# Patient Record
Sex: FEMALE | Race: BLACK OR AFRICAN AMERICAN | NOT HISPANIC OR LATINO | URBAN - METROPOLITAN AREA
[De-identification: names, ages, dates, MRNs, and addresses within clinical notes are randomized per-mention and may not be internally consistent; named-entity substitution may affect disease eponyms.]

---

## 2019-03-31 ENCOUNTER — EMERGENCY (EMERGENCY)
Facility: HOSPITAL | Age: 29
LOS: 1 days | Discharge: HOME | End: 2019-03-31
Attending: EMERGENCY MEDICINE
Payer: MEDICAID

## 2019-03-31 VITALS
TEMPERATURE: 100 F | DIASTOLIC BLOOD PRESSURE: 74 MMHG | SYSTOLIC BLOOD PRESSURE: 160 MMHG | RESPIRATION RATE: 18 BRPM | HEART RATE: 104 BPM | OXYGEN SATURATION: 100 %

## 2019-03-31 VITALS
DIASTOLIC BLOOD PRESSURE: 84 MMHG | OXYGEN SATURATION: 97 % | SYSTOLIC BLOOD PRESSURE: 122 MMHG | HEIGHT: 64 IN | RESPIRATION RATE: 16 BRPM | WEIGHT: 240.08 LBS | TEMPERATURE: 99 F | HEART RATE: 109 BPM

## 2019-03-31 LAB
ALBUMIN SERPL ELPH-MCNC: 4.1 G/DL — SIGNIFICANT CHANGE UP (ref 3.3–5.2)
ALP SERPL-CCNC: 65 U/L — SIGNIFICANT CHANGE UP (ref 40–120)
ALT FLD-CCNC: 18 U/L — SIGNIFICANT CHANGE UP
ANION GAP SERPL CALC-SCNC: 11 MMOL/L — SIGNIFICANT CHANGE UP (ref 5–17)
AST SERPL-CCNC: 13 U/L — SIGNIFICANT CHANGE UP
BASOPHILS # BLD AUTO: 0 K/UL — SIGNIFICANT CHANGE UP (ref 0–0.2)
BASOPHILS NFR BLD AUTO: 0.3 % — SIGNIFICANT CHANGE UP (ref 0–2)
BILIRUB SERPL-MCNC: <0.2 MG/DL — LOW (ref 0.4–2)
BUN SERPL-MCNC: 8 MG/DL — SIGNIFICANT CHANGE UP (ref 8–20)
CALCIUM SERPL-MCNC: 8.8 MG/DL — SIGNIFICANT CHANGE UP (ref 8.6–10.2)
CHLORIDE SERPL-SCNC: 103 MMOL/L — SIGNIFICANT CHANGE UP (ref 98–107)
CO2 SERPL-SCNC: 25 MMOL/L — SIGNIFICANT CHANGE UP (ref 22–29)
CREAT SERPL-MCNC: 0.74 MG/DL — SIGNIFICANT CHANGE UP (ref 0.5–1.3)
D DIMER BLD IA.RAPID-MCNC: 170 NG/ML DDU — SIGNIFICANT CHANGE UP
EOSINOPHIL # BLD AUTO: 0.1 K/UL — SIGNIFICANT CHANGE UP (ref 0–0.5)
EOSINOPHIL NFR BLD AUTO: 1.1 % — SIGNIFICANT CHANGE UP (ref 0–6)
GLUCOSE SERPL-MCNC: 109 MG/DL — SIGNIFICANT CHANGE UP (ref 70–115)
HCT VFR BLD CALC: 36.1 % — LOW (ref 37–47)
HGB BLD-MCNC: 11.5 G/DL — LOW (ref 12–16)
LYMPHOCYTES # BLD AUTO: 0.8 K/UL — LOW (ref 1–4.8)
LYMPHOCYTES # BLD AUTO: 11 % — LOW (ref 20–55)
MCHC RBC-ENTMCNC: 25.8 PG — LOW (ref 27–31)
MCHC RBC-ENTMCNC: 31.9 G/DL — LOW (ref 32–36)
MCV RBC AUTO: 80.9 FL — LOW (ref 81–99)
MONOCYTES # BLD AUTO: 0.4 K/UL — SIGNIFICANT CHANGE UP (ref 0–0.8)
MONOCYTES NFR BLD AUTO: 5.6 % — SIGNIFICANT CHANGE UP (ref 3–10)
NEUTROPHILS # BLD AUTO: 5.7 K/UL — SIGNIFICANT CHANGE UP (ref 1.8–8)
NEUTROPHILS NFR BLD AUTO: 81.7 % — HIGH (ref 37–73)
PLATELET # BLD AUTO: 350 K/UL — SIGNIFICANT CHANGE UP (ref 150–400)
POTASSIUM SERPL-MCNC: 3.6 MMOL/L — SIGNIFICANT CHANGE UP (ref 3.5–5.3)
POTASSIUM SERPL-SCNC: 3.6 MMOL/L — SIGNIFICANT CHANGE UP (ref 3.5–5.3)
PROT SERPL-MCNC: 7.8 G/DL — SIGNIFICANT CHANGE UP (ref 6.6–8.7)
RBC # BLD: 4.46 M/UL — SIGNIFICANT CHANGE UP (ref 4.4–5.2)
RBC # FLD: 16.2 % — HIGH (ref 11–15.6)
SODIUM SERPL-SCNC: 139 MMOL/L — SIGNIFICANT CHANGE UP (ref 135–145)
TROPONIN T SERPL-MCNC: <0.01 NG/ML — SIGNIFICANT CHANGE UP (ref 0–0.06)
WBC # BLD: 7 K/UL — SIGNIFICANT CHANGE UP (ref 4.8–10.8)
WBC # FLD AUTO: 7 K/UL — SIGNIFICANT CHANGE UP (ref 4.8–10.8)

## 2019-03-31 PROCEDURE — 80053 COMPREHEN METABOLIC PANEL: CPT

## 2019-03-31 PROCEDURE — 93005 ELECTROCARDIOGRAM TRACING: CPT

## 2019-03-31 PROCEDURE — 99284 EMERGENCY DEPT VISIT MOD MDM: CPT | Mod: 25

## 2019-03-31 PROCEDURE — 85027 COMPLETE CBC AUTOMATED: CPT

## 2019-03-31 PROCEDURE — 84484 ASSAY OF TROPONIN QUANT: CPT

## 2019-03-31 PROCEDURE — 36415 COLL VENOUS BLD VENIPUNCTURE: CPT

## 2019-03-31 PROCEDURE — 96375 TX/PRO/DX INJ NEW DRUG ADDON: CPT

## 2019-03-31 PROCEDURE — 93010 ELECTROCARDIOGRAM REPORT: CPT

## 2019-03-31 PROCEDURE — 85379 FIBRIN DEGRADATION QUANT: CPT

## 2019-03-31 PROCEDURE — 71045 X-RAY EXAM CHEST 1 VIEW: CPT

## 2019-03-31 PROCEDURE — 71045 X-RAY EXAM CHEST 1 VIEW: CPT | Mod: 26

## 2019-03-31 PROCEDURE — 94640 AIRWAY INHALATION TREATMENT: CPT

## 2019-03-31 PROCEDURE — 99284 EMERGENCY DEPT VISIT MOD MDM: CPT

## 2019-03-31 PROCEDURE — 96374 THER/PROPH/DIAG INJ IV PUSH: CPT

## 2019-03-31 RX ORDER — LEVETIRACETAM 250 MG/1
1 TABLET, FILM COATED ORAL
Qty: 0 | Refills: 0 | COMMUNITY

## 2019-03-31 RX ORDER — KETOROLAC TROMETHAMINE 30 MG/ML
15 SYRINGE (ML) INJECTION ONCE
Qty: 0 | Refills: 0 | Status: DISCONTINUED | OUTPATIENT
Start: 2019-03-31 | End: 2019-03-31

## 2019-03-31 RX ORDER — IPRATROPIUM/ALBUTEROL SULFATE 18-103MCG
3 AEROSOL WITH ADAPTER (GRAM) INHALATION ONCE
Qty: 0 | Refills: 0 | Status: COMPLETED | OUTPATIENT
Start: 2019-03-31 | End: 2019-03-31

## 2019-03-31 RX ORDER — AMLODIPINE BESYLATE 2.5 MG/1
0 TABLET ORAL
Qty: 0 | Refills: 0 | COMMUNITY

## 2019-03-31 RX ORDER — ACETAMINOPHEN 500 MG
650 TABLET ORAL ONCE
Qty: 0 | Refills: 0 | Status: COMPLETED | OUTPATIENT
Start: 2019-03-31 | End: 2019-03-31

## 2019-03-31 RX ORDER — OMEPRAZOLE 10 MG/1
0 CAPSULE, DELAYED RELEASE ORAL
Qty: 0 | Refills: 0 | COMMUNITY

## 2019-03-31 RX ADMIN — Medication 15 MILLIGRAM(S): at 17:53

## 2019-03-31 RX ADMIN — Medication 650 MILLIGRAM(S): at 17:54

## 2019-03-31 RX ADMIN — Medication 125 MILLIGRAM(S): at 17:06

## 2019-03-31 RX ADMIN — Medication 3 MILLILITER(S): at 17:07

## 2019-03-31 NOTE — ED ADULT NURSE NOTE - OBJECTIVE STATEMENT
28 year old female in no acute distress, alert and oriented x 4, BIBA from home c/o asthma exacerbation, cough and cp with cough x 2 days, reports 5 year old daughter recently ill with same symptoms. Cardiac and O2 monitoring in place, fall precautions in place, Dr. Mccabe at bedside, will monitor.

## 2019-03-31 NOTE — ED PROVIDER NOTE - PROGRESS NOTE DETAILS
Yes, it is OK to put the patient in cancellation spot for a 20 minute visit.   Pt's breathing has improved. Pt. only has mild headache and leg and back pain. All are chronic issues. Pt. requesting pain management follow up. Pt. stable for discharge.

## 2019-03-31 NOTE — CHART NOTE - NSCHARTNOTEFT_GEN_A_CORE
RYANNote: p/c from ED  ([place]) pt medically ready for d/c, unable to arrange [facility] transport for pt to return home. [facility] requested reservation number [phone] . A router will  with taxi company and [facility] . Pt awaiting in the ED lobby.

## 2019-03-31 NOTE — ED PROVIDER NOTE - OBJECTIVE STATEMENT
Pt. present to ED c/o chest pain x2 days. CP worst with cough or deep breath. Pt. has hx of asthma/bronchitis(non-smoker) and states that she has been using her inhaler for the past few days with no relief. Pt. states that he has been wheezing. Pt's daughter was sick with fever and vomiting prior to patient getting sick. Pt's cough is productive. Pt. had fever yesterday. Pt. also has hx of HTN/Seizure/DVT(2012). Pt. also c/o b/l leg pain.

## 2019-03-31 NOTE — ED ADULT TRIAGE NOTE - CHIEF COMPLAINT QUOTE
Pt brought in by ambulance from home for eval of intermittent chest pain, non radiating x2 days. Pt states that she has asthma and states that she has chest tightness unrelieved by MDI. Resps even and unlabored at present.

## 2019-03-31 NOTE — ED ADULT NURSE NOTE - PMH
Acid reflux    Anxiety    Bulging lumbar disc    Depressed    DVT (deep venous thrombosis)    HTN (hypertension)    Nerve damage  bilateral legs  PTSD (post-traumatic stress disorder)    Seizures

## 2019-07-13 PROBLEM — F43.10 POST-TRAUMATIC STRESS DISORDER, UNSPECIFIED: Chronic | Status: ACTIVE | Noted: 2019-03-31

## 2019-07-13 PROBLEM — K21.9 GASTRO-ESOPHAGEAL REFLUX DISEASE WITHOUT ESOPHAGITIS: Chronic | Status: ACTIVE | Noted: 2019-03-31

## 2019-07-13 PROBLEM — T14.8XXA OTHER INJURY OF UNSPECIFIED BODY REGION, INITIAL ENCOUNTER: Chronic | Status: ACTIVE | Noted: 2019-03-31

## 2019-07-13 PROBLEM — R56.9 UNSPECIFIED CONVULSIONS: Chronic | Status: ACTIVE | Noted: 2019-03-31

## 2019-07-13 PROBLEM — M51.26 OTHER INTERVERTEBRAL DISC DISPLACEMENT, LUMBAR REGION: Chronic | Status: ACTIVE | Noted: 2019-03-31

## 2019-07-15 ENCOUNTER — APPOINTMENT (OUTPATIENT)
Dept: NEUROLOGY | Facility: CLINIC | Age: 29
End: 2019-07-15
Payer: MEDICARE

## 2019-07-15 VITALS — SYSTOLIC BLOOD PRESSURE: 130 MMHG | HEIGHT: 64 IN | DIASTOLIC BLOOD PRESSURE: 80 MMHG

## 2019-07-15 DIAGNOSIS — Z86.59 PERSONAL HISTORY OF OTHER MENTAL AND BEHAVIORAL DISORDERS: ICD-10-CM

## 2019-07-15 DIAGNOSIS — M54.16 RADICULOPATHY, LUMBAR REGION: ICD-10-CM

## 2019-07-15 DIAGNOSIS — Z82.49 FAMILY HISTORY OF ISCHEMIC HEART DISEASE AND OTHER DISEASES OF THE CIRCULATORY SYSTEM: ICD-10-CM

## 2019-07-15 DIAGNOSIS — Z86.79 PERSONAL HISTORY OF OTHER DISEASES OF THE CIRCULATORY SYSTEM: ICD-10-CM

## 2019-07-15 DIAGNOSIS — G43.909 MIGRAINE, UNSPECIFIED, NOT INTRACTABLE, W/OUT STATUS MIGRAINOSUS: ICD-10-CM

## 2019-07-15 DIAGNOSIS — Z87.19 PERSONAL HISTORY OF OTHER DISEASES OF THE DIGESTIVE SYSTEM: ICD-10-CM

## 2019-07-15 DIAGNOSIS — Z78.9 OTHER SPECIFIED HEALTH STATUS: ICD-10-CM

## 2019-07-15 DIAGNOSIS — G40.909 EPILEPSY, UNSPECIFIED, NOT INTRACTABLE, W/OUT STATUS EPILEPTICUS: ICD-10-CM

## 2019-07-15 PROCEDURE — 99204 OFFICE O/P NEW MOD 45 MIN: CPT

## 2019-07-15 RX ORDER — GABAPENTIN 100 MG/1
CAPSULE ORAL
Refills: 0 | Status: ACTIVE | COMMUNITY

## 2019-07-15 RX ORDER — CLONIDINE HYDROCHLORIDE 0.3 MG/1
TABLET ORAL
Refills: 0 | Status: ACTIVE | COMMUNITY

## 2019-07-15 RX ORDER — AMLODIPINE BESYLATE 5 MG/1
TABLET ORAL
Refills: 0 | Status: ACTIVE | COMMUNITY

## 2019-07-15 RX ORDER — OMEPRAZOLE MAGNESIUM 40 MG/1
40 CAPSULE, DELAYED RELEASE ORAL
Refills: 0 | Status: ACTIVE | COMMUNITY

## 2019-07-15 NOTE — REVIEW OF SYSTEMS
[Numbness] : numbness [As Noted in HPI] : as noted in HPI [Limb Pain] : limb pain [Negative] : Heme/Lymph [de-identified] : History of possible seizure, Pain in her legs
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
## 2019-07-15 NOTE — PHYSICAL EXAM
[General Appearance - Alert] : alert [General Appearance - In No Acute Distress] : in no acute distress [General Appearance - Well Nourished] : well nourished [General Appearance - Well Developed] : well developed [FreeTextEntry1] : Obese [Person] : oriented to person [Place] : oriented to place [Time] : oriented to time [Short Term Intact] : short term memory impaired [Remote Intact] : remote memory intact [Registration Intact] : recent registration memory intact [Span Intact] : the attention span was normal [Concentration Intact] : normal concentrating ability [Visual Intact] : visual attention was ~T not ~L decreased [Naming Objects] : no difficulty naming common objects [Repeating Phrases] : no difficulty repeating a phrase [Fluency] : fluency intact [Comprehension] : comprehension intact [Current Events] : adequate knowledge of current events [Past History] : adequate knowledge of personal past history [Cranial Nerves Optic (II)] : visual acuity intact bilaterally,  visual fields full to confrontation, pupils equal round and reactive to light [Cranial Nerves Oculomotor (III)] : extraocular motion intact [Cranial Nerves Trigeminal (V)] : facial sensation intact symmetrically [Cranial Nerves Facial (VII)] : face symmetrical [Cranial Nerves Vestibulocochlear (VIII)] : hearing was intact bilaterally [Cranial Nerves Glossopharyngeal (IX)] : tongue and palate midline [Cranial Nerves Accessory (XI - Cranial And Spinal)] : head turning and shoulder shrug symmetric [Cranial Nerves Hypoglossal (XII)] : there was no tongue deviation with protrusion [Motor Strength] : muscle strength was normal in all four extremities [No Muscle Atrophy] : normal bulk in all four extremities [Paresis Pronator Drift Right-Sided] : no pronator drift on the right [Paresis Pronator Drift Left-Sided] : no pronator drift on the left [Sensation Tactile Decrease] : light touch was intact [Sensation Pain / Temperature Decrease] : pain and temperature was intact [Sensation Vibration Decrease] : vibration was intact [Proprioception] : proprioception was intact [Balance] : balance was intact [Tremor] : no tremor present [Coordination - Dysmetria Impaired Finger-to-Nose Bilateral] : not present [1+] : Patella left 1+ [FreeTextEntry4] : one out of  3 recall [FreeTextEntry6] : Mild give way weakness in the legs [Sclera] : the sclera and conjunctiva were normal [PERRL With Normal Accommodation] : pupils were equal in size, round, reactive to light, with normal accommodation [Extraocular Movements] : extraocular movements were intact [Optic Disc Abnormality] : the optic disc were normal in size and color [No APD] : no afferent pupillary defect [No ISABEL] : no internuclear ophthalmoplegia [Full Visual Field] : full visual field [___ +] : [unfilled]+ pitting edema to L ankle [Abnormal Walk] : normal gait [Involuntary Movements] : no involuntary movements were seen [Motor Tone] : muscle strength and tone were normal

## 2019-07-15 NOTE — CONSULT LETTER
[Dear  ___] : Dear ~FRANDY, [Consult Letter:] : I had the pleasure of evaluating your patient, [unfilled]. [Please see my note below.] : Please see my note below. [Consult Closing:] : Thank you very much for allowing me to participate in the care of this patient.  If you have any questions, please do not hesitate to contact me. [Sincerely,] : Sincerely, [FreeTextEntry3] : Umberto Lemus M.D., Ph.D. DPN-N\par [facility]\par Neurology at [place]\par Medical Director of Stroke Services\par [facility]\par

## 2019-07-15 NOTE — ASSESSMENT
[FreeTextEntry1] : This is a 29-year-old woman with a history of possible nerve damage into her legs as well as seizures. I would like to root do an EMG nerve conduction study of her legs to confirm any nerve damage. This will help determine if and how I can treat her. Regarding the pain she should probably see pain management. Regarding her seizure disorder and like to do an EEG to confirm any epileptic activity. If so I may need to increase her Keppra. I will call her with results of these tests and see her back in the office in 3 months, sooner should the need arise.

## 2019-07-26 ENCOUNTER — APPOINTMENT (OUTPATIENT)
Dept: NEUROLOGY | Facility: CLINIC | Age: 29
End: 2019-07-26

## 2019-09-02 PROBLEM — Z86.59 HISTORY OF POST TRAUMATIC STRESS DISORDER: Status: RESOLVED | Noted: 2019-07-15 | Resolved: 2019-09-02

## 2019-09-19 ENCOUNTER — APPOINTMENT (OUTPATIENT)
Dept: NEUROLOGY | Facility: CLINIC | Age: 29
End: 2019-09-19

## 2019-10-14 ENCOUNTER — APPOINTMENT (OUTPATIENT)
Dept: NEUROLOGY | Facility: CLINIC | Age: 29
End: 2019-10-14

## 2020-06-15 ENCOUNTER — APPOINTMENT (OUTPATIENT)
Dept: INTERNAL MEDICINE | Facility: CLINIC | Age: 30
End: 2020-06-15

## 2023-04-06 ENCOUNTER — EMERGENCY (EMERGENCY)
Facility: HOSPITAL | Age: 33
LOS: 1 days | Discharge: HOME | End: 2023-04-06
Attending: EMERGENCY MEDICINE
Payer: MEDICARE

## 2023-04-06 VITALS
SYSTOLIC BLOOD PRESSURE: 159 MMHG | HEART RATE: 85 BPM | OXYGEN SATURATION: 100 % | TEMPERATURE: 98 F | DIASTOLIC BLOOD PRESSURE: 133 MMHG | RESPIRATION RATE: 18 BRPM

## 2023-04-06 VITALS
OXYGEN SATURATION: 99 % | HEART RATE: 106 BPM | TEMPERATURE: 98 F | DIASTOLIC BLOOD PRESSURE: 97 MMHG | RESPIRATION RATE: 18 BRPM | SYSTOLIC BLOOD PRESSURE: 151 MMHG

## 2023-04-06 LAB
ANION GAP SERPL CALC-SCNC: 10 MMOL/L — SIGNIFICANT CHANGE UP (ref 5–17)
APTT BLD: 33.2 SEC — SIGNIFICANT CHANGE UP (ref 27.5–35.5)
BASOPHILS # BLD AUTO: 0.03 K/UL — SIGNIFICANT CHANGE UP (ref 0–0.2)
BASOPHILS NFR BLD AUTO: 0.7 % — SIGNIFICANT CHANGE UP (ref 0–2)
BUN SERPL-MCNC: 8.2 MG/DL — SIGNIFICANT CHANGE UP (ref 8–20)
CALCIUM SERPL-MCNC: 8.4 MG/DL — SIGNIFICANT CHANGE UP (ref 8.4–10.5)
CHLORIDE SERPL-SCNC: 104 MMOL/L — SIGNIFICANT CHANGE UP (ref 96–108)
CO2 SERPL-SCNC: 22 MMOL/L — SIGNIFICANT CHANGE UP (ref 22–29)
CREAT SERPL-MCNC: 0.76 MG/DL — SIGNIFICANT CHANGE UP (ref 0.5–1.3)
EGFR: 105 ML/MIN/1.73M2 — SIGNIFICANT CHANGE UP
EOSINOPHIL # BLD AUTO: 0.1 K/UL — SIGNIFICANT CHANGE UP (ref 0–0.5)
EOSINOPHIL NFR BLD AUTO: 2.2 % — SIGNIFICANT CHANGE UP (ref 0–6)
GLUCOSE SERPL-MCNC: 83 MG/DL — SIGNIFICANT CHANGE UP (ref 70–99)
HCG SERPL-ACNC: <4 MIU/ML — SIGNIFICANT CHANGE UP
HCT VFR BLD CALC: 30.6 % — LOW (ref 34.5–45)
HGB BLD-MCNC: 10.1 G/DL — LOW (ref 11.5–15.5)
IMM GRANULOCYTES NFR BLD AUTO: 0 % — SIGNIFICANT CHANGE UP (ref 0–0.9)
INR BLD: 1.14 RATIO — SIGNIFICANT CHANGE UP (ref 0.88–1.16)
LYMPHOCYTES # BLD AUTO: 2.35 K/UL — SIGNIFICANT CHANGE UP (ref 1–3.3)
LYMPHOCYTES # BLD AUTO: 52.5 % — HIGH (ref 13–44)
MCHC RBC-ENTMCNC: 26.8 PG — LOW (ref 27–34)
MCHC RBC-ENTMCNC: 33 GM/DL — SIGNIFICANT CHANGE UP (ref 32–36)
MCV RBC AUTO: 81.2 FL — SIGNIFICANT CHANGE UP (ref 80–100)
MONOCYTES # BLD AUTO: 0.36 K/UL — SIGNIFICANT CHANGE UP (ref 0–0.9)
MONOCYTES NFR BLD AUTO: 8 % — SIGNIFICANT CHANGE UP (ref 2–14)
NEUTROPHILS # BLD AUTO: 1.64 K/UL — LOW (ref 1.8–7.4)
NEUTROPHILS NFR BLD AUTO: 36.6 % — LOW (ref 43–77)
PLATELET # BLD AUTO: 265 K/UL — SIGNIFICANT CHANGE UP (ref 150–400)
POTASSIUM SERPL-MCNC: 3.7 MMOL/L — SIGNIFICANT CHANGE UP (ref 3.5–5.3)
POTASSIUM SERPL-SCNC: 3.7 MMOL/L — SIGNIFICANT CHANGE UP (ref 3.5–5.3)
PROTHROM AB SERPL-ACNC: 13.3 SEC — SIGNIFICANT CHANGE UP (ref 10.5–13.4)
RBC # BLD: 3.77 M/UL — LOW (ref 3.8–5.2)
RBC # FLD: 17.6 % — HIGH (ref 10.3–14.5)
SODIUM SERPL-SCNC: 136 MMOL/L — SIGNIFICANT CHANGE UP (ref 135–145)
TROPONIN T SERPL-MCNC: <0.01 NG/ML — SIGNIFICANT CHANGE UP (ref 0–0.06)
WBC # BLD: 4.48 K/UL — SIGNIFICANT CHANGE UP (ref 3.8–10.5)
WBC # FLD AUTO: 4.48 K/UL — SIGNIFICANT CHANGE UP (ref 3.8–10.5)

## 2023-04-06 PROCEDURE — 80048 BASIC METABOLIC PNL TOTAL CA: CPT

## 2023-04-06 PROCEDURE — 85610 PROTHROMBIN TIME: CPT

## 2023-04-06 PROCEDURE — 71275 CT ANGIOGRAPHY CHEST: CPT | Mod: ME

## 2023-04-06 PROCEDURE — 84484 ASSAY OF TROPONIN QUANT: CPT

## 2023-04-06 PROCEDURE — 85025 COMPLETE CBC W/AUTO DIFF WBC: CPT

## 2023-04-06 PROCEDURE — 96374 THER/PROPH/DIAG INJ IV PUSH: CPT | Mod: XU

## 2023-04-06 PROCEDURE — 36415 COLL VENOUS BLD VENIPUNCTURE: CPT

## 2023-04-06 PROCEDURE — 93970 EXTREMITY STUDY: CPT | Mod: 26

## 2023-04-06 PROCEDURE — 99285 EMERGENCY DEPT VISIT HI MDM: CPT

## 2023-04-06 PROCEDURE — 93005 ELECTROCARDIOGRAM TRACING: CPT

## 2023-04-06 PROCEDURE — 85730 THROMBOPLASTIN TIME PARTIAL: CPT

## 2023-04-06 PROCEDURE — 83880 ASSAY OF NATRIURETIC PEPTIDE: CPT

## 2023-04-06 PROCEDURE — G1004: CPT

## 2023-04-06 PROCEDURE — 71275 CT ANGIOGRAPHY CHEST: CPT | Mod: 26,ME

## 2023-04-06 PROCEDURE — 84702 CHORIONIC GONADOTROPIN TEST: CPT

## 2023-04-06 PROCEDURE — 99285 EMERGENCY DEPT VISIT HI MDM: CPT | Mod: 25

## 2023-04-06 PROCEDURE — 93970 EXTREMITY STUDY: CPT

## 2023-04-06 PROCEDURE — 93010 ELECTROCARDIOGRAM REPORT: CPT

## 2023-04-06 RX ORDER — KETOROLAC TROMETHAMINE 30 MG/ML
30 SYRINGE (ML) INJECTION ONCE
Refills: 0 | Status: DISCONTINUED | OUTPATIENT
Start: 2023-04-06 | End: 2023-04-06

## 2023-04-06 RX ORDER — ACETAMINOPHEN 500 MG
975 TABLET ORAL ONCE
Refills: 0 | Status: COMPLETED | OUTPATIENT
Start: 2023-04-06 | End: 2023-04-06

## 2023-04-06 RX ADMIN — Medication 975 MILLIGRAM(S): at 10:50

## 2023-04-06 RX ADMIN — Medication 30 MILLIGRAM(S): at 04:45

## 2023-04-06 NOTE — ED ADULT TRIAGE NOTE - CHIEF COMPLAINT QUOTE
Pt ambulatory with strong and steady gait c/o elevated bp with pounding headache, bilateral leg swelling and pain - from knees down to lower leg, sob on exertion and also reported dog bite to R hand 3 days ago. PMH: DVT bilateral leg, Irregular heart rhythm, Seizure on keppra.

## 2023-04-06 NOTE — ED PROVIDER NOTE - NSFOLLOWUPCLINICS_GEN_ALL_ED_FT
[facility]  Cardiology  [address], [address]  [place] [address]  Phone: [phone]  Fax:

## 2023-04-06 NOTE — ED PROVIDER NOTE - PHYSICAL EXAMINATION
Gen: No acute distress, non toxic  HEENT: Mucous membranes moist, pink conjunctivae, EOMI. PERRL. Airway patent.   CV: RRR, nl s1/s2.  Resp: CTAB, normal rate and effort. No wheezes, rhonchi, or crackles.   GI: Abdomen soft, NT, ND. No rebound, no guarding  Neuro: A&O x4, MAEx4. 5/5 str ext x 4. Sensation intact, symmetric throughout. No FND's. Gait intact. CN 2-12 intact.   MSK: No midline spinal ttp. No visualized or palpable deformities.  Skin: No rashes, skin intact and well perfused. Cap refill <2sec  Vascular: 2+pitting edema left lower extremity. Radial and dorsalis pedal pulses 2+ b/l

## 2023-04-06 NOTE — ED PROVIDER NOTE - OBJECTIVE STATEMENT
32y female PMHx enlarged heart, irregular heart beat, DVT in bilateral lower extremities with "venous duplex surgery" presents for b/l lower leg pain x 3 days, HA. Pt reports 3 days of lower leg pain. Pt reports pain is constant, left leg feels tighter than right. Pt reports HA that began today, similar in characteristic to previous HA. Pt states she told triage she has chest pain for past day that felt like tightness that has resolved. Pt denies SOB, chest pain, palpitations, pain with inspiration, lightheadedness, dizziness, change in vision, confusion, weakness, loss of balance.

## 2023-04-06 NOTE — ED PROVIDER NOTE - NSICDXPASTMEDICALHX_GEN_ALL_CORE_FT
PAST MEDICAL HISTORY:  Acid reflux     Anxiety     Bulging lumbar disc     Depressed     DVT (deep venous thrombosis)     HTN (hypertension)     Nerve damage bilateral legs    PTSD (post-traumatic stress disorder)     Seizures

## 2023-04-06 NOTE — ED PROVIDER NOTE - NS ED ATTENDING STATEMENT MOD
This was a shared visit with the CECILE. I reviewed and verified the documentation and independently performed the documented:

## 2023-04-06 NOTE — ED PROVIDER NOTE - ATTENDING APP SHARED VISIT CONTRIBUTION OF CARE
32y F w/ hx cardiomyopathy, DVT (not on AC), neuropathy, seizures; presents for leg pain. Pt complaining of 3 days of b/l leg pain/swelling. Also complaining of chest tightness for the past day, now resolved. On exam, pt well appearing and in no distress. Stable VS. Heart RRR, lungs CTAB. +Mild nonpitting edema b/l lower extremities. Check CTA chest, US venous duplex lower extremities, labs, EKG.
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
## 2023-04-06 NOTE — ED PROVIDER NOTE - CLINICAL SUMMARY MEDICAL DECISION MAKING FREE TEXT BOX
32y female PMHx enlarged heart, irregular heart beat, DVT in bilateral lower extremities with "venous duplex surgery" presents for b/l lower leg pain x 3 days, HA. Pt reports 3 days of lower leg pain. Pt reports pain is constant, left leg feels tighter than right. Pt reports HA that began today, similar in characteristic to previous HA. Pt states she told triage she has chest pain for past day that felt like tightness that has resolved. Pt denies SOB, chest pain, palpitations, pain with inspiration, lightheadedness, dizziness, change in vision, confusion, weakness, loss of balance.  +pitting edema LLE.   US, Labs, re-assess 32y female PMHx enlarged heart, irregular heart beat, DVT in bilateral lower extremities with "venous duplex surgery" presents for b/l lower leg pain x 3 days, HA. Pt reports 3 days of lower leg pain. Pt reports pain is constant, left leg feels tighter than right. Pt reports HA that began today, similar in characteristic to previous HA. Pt states she told triage she has chest pain for past day that felt like tightness that has resolved. Pt denies SOB, chest pain, palpitations, pain with inspiration, lightheadedness, dizziness, change in vision, confusion, weakness, loss of balance.  PE- +pitting edema LLE.   US, Labs, re-assess  US negative for dvt/ CTA performed and negative for PE in lobar branches  labs reviewed and pt with mild anemia- advised f/u.   SW consulted for resources. Luis cortez.

## 2023-04-06 NOTE — ED ADULT NURSE NOTE - OBJECTIVE STATEMENT
pt. c/o bilateral rib pain, when asked location, states "sides", denies injury, unsure of etiology, onset today.  States it hurts a lot, unable to assign a number to pain.  Denies dizziness/N/V/urinary sxs.  Denies medical hx.  Pt. not a good historian.

## 2023-04-06 NOTE — ED PROVIDER NOTE - PATIENT PORTAL LINK FT
You can access the FollowMyHealth Patient Portal offered by [facility] by registering at the following website: [email or web address]. By joining [employer]’s FollowMyHealth portal, you will also be able to view your health information using other applications (apps) compatible with our system.

## 2023-04-06 NOTE — CHART NOTE - NSCHARTNOTEFT_GEN_A_CORE
RYAN Note: SW made aware by ED provider pt is stable for d/c, is requesting SW for food insecurity. SW met w/ pt at bedside, pt disclosed she currently resides in a shelter with her kids since June ([facility] in [place]), pt stated she doesn't drive and there are no stores in walking distance. Pt stated there is a food pantry at [facility]placed [facility] however residents are only able to go 2x a month. SW discussed w/ pt food pantries in area, list provided, pt also expressed interest [facility] [employer] referral, referral sent on behalf of pt via TEAMS, SW also completed community needs resource assessment and provided pt w/ [facility] Community resource list.     Pt initially requested medicaid taxi home, SW began to arrange however pt then told PA she found a ride and no longer needs transport, medicaid taxi canceled, no other needs identified

## 2023-04-06 NOTE — ED ADULT NURSE REASSESSMENT NOTE - NS ED NURSE REASSESS COMMENT FT1
d/c per PA and CM  iv d/c
assumed care of pt from night RN; pt pending CM/SW for housing placement. offers no complaints , will continue to monitor

## 2023-05-02 ENCOUNTER — TRANSCRIPTION ENCOUNTER (OUTPATIENT)
Age: 33
End: 2023-05-02

## 2023-05-05 ENCOUNTER — APPOINTMENT (OUTPATIENT)
Dept: CARDIOLOGY | Facility: CLINIC | Age: 33
End: 2023-05-05

## 2023-05-30 ENCOUNTER — NON-APPOINTMENT (OUTPATIENT)
Age: 33
End: 2023-05-30

## 2023-05-31 ENCOUNTER — NON-APPOINTMENT (OUTPATIENT)
Age: 33
End: 2023-05-31

## 2023-05-31 ENCOUNTER — APPOINTMENT (OUTPATIENT)
Dept: CARDIOLOGY | Facility: CLINIC | Age: 33
End: 2023-05-31

## 2023-05-31 DIAGNOSIS — R56.9 UNSPECIFIED CONVULSIONS: ICD-10-CM

## 2023-05-31 DIAGNOSIS — I82.4Z9 ACUTE EMBOLISM AND THROMBOSIS OF UNSPECIFIED DEEP VEINS OF UNSPECIFIED DISTAL LOWER EXTREMITY: ICD-10-CM

## 2023-05-31 DIAGNOSIS — M51.36 OTHER INTERVERTEBRAL DISC DEGENERATION, LUMBAR REGION: ICD-10-CM

## 2023-05-31 RX ORDER — LEVETIRACETAM 500 MG/1
500 TABLET, FILM COATED ORAL TWICE DAILY
Qty: 60 | Refills: 5 | Status: ACTIVE | COMMUNITY

## 2023-05-31 RX ORDER — ESCITALOPRAM OXALATE 5 MG/1
TABLET, FILM COATED ORAL
Refills: 0 | Status: ACTIVE | COMMUNITY

## 2023-05-31 RX ORDER — PREDNISONE 20 MG/1
20 TABLET ORAL DAILY
Refills: 0 | Status: DISCONTINUED | COMMUNITY
End: 2023-05-31

## 2023-06-18 ENCOUNTER — EMERGENCY (EMERGENCY)
Facility: HOSPITAL | Age: 33
LOS: 1 days | Discharge: HOME | End: 2023-06-18
Attending: STUDENT IN AN ORGANIZED HEALTH CARE EDUCATION/TRAINING PROGRAM
Payer: MEDICARE

## 2023-06-18 VITALS
HEIGHT: 64 IN | SYSTOLIC BLOOD PRESSURE: 152 MMHG | OXYGEN SATURATION: 100 % | WEIGHT: 237.44 LBS | TEMPERATURE: 99 F | RESPIRATION RATE: 18 BRPM | HEART RATE: 121 BPM | DIASTOLIC BLOOD PRESSURE: 112 MMHG

## 2023-06-18 PROCEDURE — 99285 EMERGENCY DEPT VISIT HI MDM: CPT

## 2023-06-18 RX ORDER — ACETAMINOPHEN 500 MG
975 TABLET ORAL ONCE
Refills: 0 | Status: COMPLETED | OUTPATIENT
Start: 2023-06-18 | End: 2023-06-18

## 2023-06-18 RX ADMIN — Medication 975 MILLIGRAM(S): at 23:53

## 2023-06-18 RX ADMIN — Medication 0.1 MILLIGRAM(S): at 23:53

## 2023-06-18 NOTE — ED ADULT TRIAGE NOTE - HEIGHT IN INCHES
[Essential Hypertension] : essential hypertension [Hyperlipidemia] : hyperlipidemia [Stable] : stable [Not Responding to Treatment] : not responding to treatment [Medication Changes Per Orders] : as documented in orders [de-identified] : 86 [FreeTextEntry1] : tc 163\par 10/12/19 4
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
## 2023-06-18 NOTE — ED ADULT TRIAGE NOTE - CHIEF COMPLAINT QUOTE
c/o of high blood pressure and headache x 2 months. Denies chest pain and headache right now.  Hx of HTN on BP meds (Norvasc, lisinopril, clonidine). Pt reports had lyme disease x 1 year c/o of high blood pressure and headache x 2 months. Denies chest pain and headache right now.  c/o of leg and back pain. Pt from shelter they said she was hallucinating.  Hx of HTN on BP meds (Norvasc, lisinopril, clonidine). Pt reports had lyme disease x 1 year

## 2023-06-18 NOTE — ED PROVIDER NOTE - NSFOLLOWUPINSTRUCTIONS_ED_ALL_ED_FT
-Your blood pressure here is elevated - please take your blood pressure medication as prescribed and try not to miss any doses  -Please follow up with your doctor and ask about your blood pressure medication regimen as your blood pressure here was elevated   -Return with any new/worse/concerning symptoms     Hypertension    Hypertension, commonly called high blood pressure, is when the force of blood pumping through your arteries is too strong. Hypertension forces your heart to work harder to pump blood. Your arteries may become narrow or stiff. Having untreated or uncontrolled hypertension for a long period of time can cause heart attack, stroke, kidney disease, and other problems. If started on a medication, take exactly as prescribed by your health care professional. Maintain a healthy lifestyle and follow up with your primary care physician.    SEEK IMMEDIATE MEDICAL CARE IF YOU HAVE ANY OF THE FOLLOWING SYMPTOMS: severe headache, confusion, chest pain, abdominal pain, vomiting, or shortness of breath.

## 2023-06-18 NOTE — ED ADULT NURSE NOTE - NSFALLUNIVINTERV_ED_ALL_ED
Bed/Stretcher in lowest position, wheels locked, appropriate side rails in place/Call bell, personal items and telephone in reach/Instruct patient to call for assistance before getting out of bed/chair/stretcher/Non-slip footwear applied when patient is off stretcher/[place] to call system/Physically safe environment - no spills, clutter or unnecessary equipment/Purposeful proactive rounding/Room/bathroom lighting operational, light cord in reach

## 2023-06-18 NOTE — ED PROVIDER NOTE - CLINICAL SUMMARY MEDICAL DECISION MAKING FREE TEXT BOX
33 year old female with hx of HTN, enlarged heart, b/l LE nerve damage who presents with hypertension. Will obtain basic labs, EKG, and reassess - [facility] PGY3
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
## 2023-06-18 NOTE — ED PROVIDER NOTE - PATIENT PORTAL LINK FT
You can access the FollowMyHealth Patient Portal offered by [facility] by registering at the following website: [email or web address]. By joining [employer]’s FollowMyHealth portal, you will also be able to view your health information using other applications (apps) compatible with our system.

## 2023-06-18 NOTE — ED ADULT NURSE NOTE - OBJECTIVE STATEMENT
pt. c/o HTN, left sided CP, LH today... pain 8/10.  Has been taking 3 BP meds and clonidine for mental health.  Lives is shelter.  Denies N/V/SOB/fever

## 2023-06-18 NOTE — ED STATDOCS - PROGRESS NOTE DETAILS
32 y/o female w/ hx of HTN, migraines, PTSD, depression anxiety, DVT, cardiac arrythmia presenting with HTN. Reports has been compliant with meds and visited ED because systolic reading was 165 earlier today. Additionally c/o migraine. Will send to main for further eval.

## 2023-06-18 NOTE — ED ADULT NURSE NOTE - CHIEF COMPLAINT QUOTE
c/o of high blood pressure and headache x 2 months. Denies chest pain and headache right now.  c/o of leg and back pain. Pt from shelter they said she was hallucinating.  Hx of HTN on BP meds (Norvasc, lisinopril, clonidine). Pt reports had lyme disease x 1 year

## 2023-06-18 NOTE — ED PROVIDER NOTE - PHYSICAL EXAMINATION
Gen: Well appearing in NAD  Head: NC/AT  Neck: trachea midline  CV: regular rate   Resp:  No distress  Ext: no deformities  Neuro:  A&O appears non focal  Skin:  Warm and dry as visualized  Psych: Calm, cooperative

## 2023-06-19 LAB
ALBUMIN SERPL ELPH-MCNC: 4 G/DL — SIGNIFICANT CHANGE UP (ref 3.3–5.2)
ALP SERPL-CCNC: 58 U/L — SIGNIFICANT CHANGE UP (ref 40–120)
ALT FLD-CCNC: 19 U/L — SIGNIFICANT CHANGE UP
ANION GAP SERPL CALC-SCNC: 10 MMOL/L — SIGNIFICANT CHANGE UP (ref 5–17)
AST SERPL-CCNC: 17 U/L — SIGNIFICANT CHANGE UP
BASOPHILS # BLD AUTO: 0.04 K/UL — SIGNIFICANT CHANGE UP (ref 0–0.2)
BASOPHILS NFR BLD AUTO: 0.7 % — SIGNIFICANT CHANGE UP (ref 0–2)
BILIRUB SERPL-MCNC: 0.2 MG/DL — LOW (ref 0.4–2)
BUN SERPL-MCNC: 9.7 MG/DL — SIGNIFICANT CHANGE UP (ref 8–20)
CALCIUM SERPL-MCNC: 8.8 MG/DL — SIGNIFICANT CHANGE UP (ref 8.4–10.5)
CHLORIDE SERPL-SCNC: 105 MMOL/L — SIGNIFICANT CHANGE UP (ref 96–108)
CO2 SERPL-SCNC: 24 MMOL/L — SIGNIFICANT CHANGE UP (ref 22–29)
CREAT SERPL-MCNC: 0.74 MG/DL — SIGNIFICANT CHANGE UP (ref 0.5–1.3)
EGFR: 108 ML/MIN/1.73M2 — SIGNIFICANT CHANGE UP
EOSINOPHIL # BLD AUTO: 0.09 K/UL — SIGNIFICANT CHANGE UP (ref 0–0.5)
EOSINOPHIL NFR BLD AUTO: 1.5 % — SIGNIFICANT CHANGE UP (ref 0–6)
GLUCOSE SERPL-MCNC: 91 MG/DL — SIGNIFICANT CHANGE UP (ref 70–99)
HCT VFR BLD CALC: 34 % — LOW (ref 34.5–45)
HGB BLD-MCNC: 11.3 G/DL — LOW (ref 11.5–15.5)
IMM GRANULOCYTES NFR BLD AUTO: 0.2 % — SIGNIFICANT CHANGE UP (ref 0–0.9)
LYMPHOCYTES # BLD AUTO: 2 K/UL — SIGNIFICANT CHANGE UP (ref 1–3.3)
LYMPHOCYTES # BLD AUTO: 34.4 % — SIGNIFICANT CHANGE UP (ref 13–44)
MCHC RBC-ENTMCNC: 27.6 PG — SIGNIFICANT CHANGE UP (ref 27–34)
MCHC RBC-ENTMCNC: 33.2 GM/DL — SIGNIFICANT CHANGE UP (ref 32–36)
MCV RBC AUTO: 83.1 FL — SIGNIFICANT CHANGE UP (ref 80–100)
MONOCYTES # BLD AUTO: 0.59 K/UL — SIGNIFICANT CHANGE UP (ref 0–0.9)
MONOCYTES NFR BLD AUTO: 10.1 % — SIGNIFICANT CHANGE UP (ref 2–14)
NEUTROPHILS # BLD AUTO: 3.09 K/UL — SIGNIFICANT CHANGE UP (ref 1.8–7.4)
NEUTROPHILS NFR BLD AUTO: 53.1 % — SIGNIFICANT CHANGE UP (ref 43–77)
PLATELET # BLD AUTO: 311 K/UL — SIGNIFICANT CHANGE UP (ref 150–400)
POTASSIUM SERPL-MCNC: 3.6 MMOL/L — SIGNIFICANT CHANGE UP (ref 3.5–5.3)
POTASSIUM SERPL-SCNC: 3.6 MMOL/L — SIGNIFICANT CHANGE UP (ref 3.5–5.3)
PROT SERPL-MCNC: 7.1 G/DL — SIGNIFICANT CHANGE UP (ref 6.6–8.7)
RBC # BLD: 4.09 M/UL — SIGNIFICANT CHANGE UP (ref 3.8–5.2)
RBC # FLD: 15.9 % — HIGH (ref 10.3–14.5)
SODIUM SERPL-SCNC: 139 MMOL/L — SIGNIFICANT CHANGE UP (ref 135–145)
TROPONIN T SERPL-MCNC: <0.01 NG/ML — SIGNIFICANT CHANGE UP (ref 0–0.06)
WBC # BLD: 5.82 K/UL — SIGNIFICANT CHANGE UP (ref 3.8–10.5)
WBC # FLD AUTO: 5.82 K/UL — SIGNIFICANT CHANGE UP (ref 3.8–10.5)

## 2023-06-19 PROCEDURE — 93005 ELECTROCARDIOGRAM TRACING: CPT

## 2023-06-19 PROCEDURE — 85025 COMPLETE CBC W/AUTO DIFF WBC: CPT

## 2023-06-19 PROCEDURE — 36415 COLL VENOUS BLD VENIPUNCTURE: CPT

## 2023-06-19 PROCEDURE — 84484 ASSAY OF TROPONIN QUANT: CPT

## 2023-06-19 PROCEDURE — 80053 COMPREHEN METABOLIC PANEL: CPT

## 2023-06-19 PROCEDURE — 84702 CHORIONIC GONADOTROPIN TEST: CPT

## 2023-06-19 PROCEDURE — 93010 ELECTROCARDIOGRAM REPORT: CPT

## 2023-06-19 PROCEDURE — 99283 EMERGENCY DEPT VISIT LOW MDM: CPT | Mod: 25

## 2023-07-04 ENCOUNTER — NON-APPOINTMENT (OUTPATIENT)
Age: 33
End: 2023-07-04

## 2024-01-16 NOTE — ED ADULT NURSE NOTE - CHIEF COMPLAINT QUOTE
Statement Selected Pt ambulatory with strong and steady gait c/o elevated bp with pounding headache, bilateral leg swelling and pain - from knees down to lower leg, sob on exertion and also reported dog bite to R hand 3 days ago. PMH: DVT bilateral leg, Irregular heart rhythm, Seizure on keppra.

## 2024-02-03 LAB — HBA1C MFR BLD HPLC: 5.4

## 2024-02-07 ENCOUNTER — APPOINTMENT (OUTPATIENT)
Dept: CARDIOLOGY | Facility: CLINIC | Age: 34
End: 2024-02-07

## 2024-02-08 ENCOUNTER — APPOINTMENT (OUTPATIENT)
Dept: CARDIOLOGY | Facility: CLINIC | Age: 34
End: 2024-02-08

## 2024-02-19 ENCOUNTER — EMERGENCY (EMERGENCY)
Facility: HOSPITAL | Age: 34
LOS: 1 days | Discharge: HOME | End: 2024-02-19
Attending: EMERGENCY MEDICINE
Payer: MEDICARE

## 2024-02-19 VITALS
DIASTOLIC BLOOD PRESSURE: 91 MMHG | WEIGHT: 223.11 LBS | SYSTOLIC BLOOD PRESSURE: 127 MMHG | TEMPERATURE: 98 F | HEIGHT: 64 IN | RESPIRATION RATE: 18 BRPM | OXYGEN SATURATION: 100 % | HEART RATE: 123 BPM

## 2024-02-19 VITALS — RESPIRATION RATE: 19 BRPM | OXYGEN SATURATION: 99 % | HEART RATE: 95 BPM

## 2024-02-19 PROCEDURE — 99283 EMERGENCY DEPT VISIT LOW MDM: CPT

## 2024-02-19 PROCEDURE — 99284 EMERGENCY DEPT VISIT MOD MDM: CPT

## 2024-02-19 RX ORDER — DEXTROAMPHETAMINE SACCHARATE, AMPHETAMINE ASPARTATE, DEXTROAMPHETAMINE SULFATE AND AMPHETAMINE SULFATE 1.875; 1.875; 1.875; 1.875 MG/1; MG/1; MG/1; MG/1
30 TABLET ORAL ONCE
Refills: 0 | Status: DISCONTINUED | OUTPATIENT
Start: 2024-02-19 | End: 2024-02-19

## 2024-02-19 RX ORDER — ALPRAZOLAM 0.25 MG
1 TABLET ORAL
Qty: 28 | Refills: 0
Start: 2024-02-19 | End: 2024-03-03

## 2024-02-19 RX ORDER — DEXTROAMPHETAMINE SACCHARATE, AMPHETAMINE ASPARTATE, DEXTROAMPHETAMINE SULFATE AND AMPHETAMINE SULFATE 1.875; 1.875; 1.875; 1.875 MG/1; MG/1; MG/1; MG/1
1 TABLET ORAL
Qty: 28 | Refills: 0
Start: 2024-02-19 | End: 2024-03-03

## 2024-02-19 RX ADMIN — DEXTROAMPHETAMINE SACCHARATE, AMPHETAMINE ASPARTATE, DEXTROAMPHETAMINE SULFATE AND AMPHETAMINE SULFATE 30 MILLIGRAM(S): 1.875; 1.875; 1.875; 1.875 TABLET ORAL at 17:23

## 2024-02-19 NOTE — ED PROVIDER NOTE - PATIENT PORTAL LINK FT
You can access the FollowMyHealth Patient Portal offered by [facility] by registering at the following website: [email or web address]. By joining [email or web address]’s FollowMyHealth portal, you will also be able to view your health information using other applications (apps) compatible with our system.
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
## 2024-02-19 NOTE — ED PROVIDER NOTE - ATTENDING APP SHARED VISIT CONTRIBUTION OF CARE
33-year-old female with history of anxiety and ADHD, on Adderall and Xanax, presents for medication refill.  Due to difficult living situation and recent challenging weather, has been unable to follow-up with her appointments.  I stop was checked, and corroborates her history of multiple recent refills of only 2 or 3-day supply at a time.  On exam, well-appearing, no acute distress, alert and oriented, demonstrates full capacity.,  Calm and cooperative.  Will refill medications, patient to follow-up with outpatient provider as scheduled.

## 2024-02-19 NOTE — ED ADULT NURSE NOTE - OBJECTIVE STATEMENT
33 yof needs psych  meds refilled, needs to follow up with md but unable to because she had to take child to dr first.

## 2024-02-19 NOTE — ED PROVIDER NOTE - NSFOLLOWUPINSTRUCTIONS_ED_ALL_ED_FT
[facility]: Administration Offices [address] [place] [id number] [phone] [email or web address] [place] [address], [place] [address] [phone] [facility] is one of the foremost providers of mental health services on [place], treating all aspects of psychiatric illness and emotional distress for every age group and socioeconomic background. Our expert staff works collaboratively with healthcare providers and care management services to achieve true integrated healthcare for individuals impacted by mental illness—and the families who love them.

## 2024-02-19 NOTE — ED PROVIDER NOTE - ADDITIONAL NOTES AND INSTRUCTIONS:
PT was evaluated At [facility] ED and was found to have a condition that warranted time of to rest and heal from WORK/SCHOOL.   Ramos Quiñonez PA-C

## 2024-05-02 ENCOUNTER — EMERGENCY (EMERGENCY)
Facility: HOSPITAL | Age: 34
LOS: 1 days | Discharge: HOME | End: 2024-05-02
Attending: EMERGENCY MEDICINE
Payer: MEDICARE

## 2024-05-02 VITALS
TEMPERATURE: 98 F | SYSTOLIC BLOOD PRESSURE: 165 MMHG | HEART RATE: 102 BPM | DIASTOLIC BLOOD PRESSURE: 100 MMHG | WEIGHT: 223.99 LBS | OXYGEN SATURATION: 99 % | RESPIRATION RATE: 16 BRPM | HEIGHT: 64 IN

## 2024-05-02 VITALS
OXYGEN SATURATION: 100 % | TEMPERATURE: 98 F | DIASTOLIC BLOOD PRESSURE: 111 MMHG | HEART RATE: 86 BPM | SYSTOLIC BLOOD PRESSURE: 154 MMHG | RESPIRATION RATE: 19 BRPM

## 2024-05-02 LAB
ALBUMIN SERPL ELPH-MCNC: 4 G/DL — SIGNIFICANT CHANGE UP (ref 3.3–5.2)
ALP SERPL-CCNC: 67 U/L — SIGNIFICANT CHANGE UP (ref 40–120)
ALT FLD-CCNC: 21 U/L — SIGNIFICANT CHANGE UP
ANION GAP SERPL CALC-SCNC: 10 MMOL/L — SIGNIFICANT CHANGE UP (ref 5–17)
AST SERPL-CCNC: 22 U/L — SIGNIFICANT CHANGE UP
BASOPHILS # BLD AUTO: 0.05 K/UL — SIGNIFICANT CHANGE UP (ref 0–0.2)
BASOPHILS NFR BLD AUTO: 0.9 % — SIGNIFICANT CHANGE UP (ref 0–2)
BILIRUB SERPL-MCNC: <0.2 MG/DL — LOW (ref 0.4–2)
BUN SERPL-MCNC: 11.4 MG/DL — SIGNIFICANT CHANGE UP (ref 8–20)
CALCIUM SERPL-MCNC: 8.4 MG/DL — SIGNIFICANT CHANGE UP (ref 8.4–10.5)
CHLORIDE SERPL-SCNC: 103 MMOL/L — SIGNIFICANT CHANGE UP (ref 96–108)
CO2 SERPL-SCNC: 24 MMOL/L — SIGNIFICANT CHANGE UP (ref 22–29)
CREAT SERPL-MCNC: 0.73 MG/DL — SIGNIFICANT CHANGE UP (ref 0.5–1.3)
EGFR: 110 ML/MIN/1.73M2 — SIGNIFICANT CHANGE UP
EOSINOPHIL # BLD AUTO: 0.13 K/UL — SIGNIFICANT CHANGE UP (ref 0–0.5)
EOSINOPHIL NFR BLD AUTO: 2.4 % — SIGNIFICANT CHANGE UP (ref 0–6)
GLUCOSE SERPL-MCNC: 94 MG/DL — SIGNIFICANT CHANGE UP (ref 70–99)
HCG SERPL-ACNC: <4 MIU/ML — SIGNIFICANT CHANGE UP
HCT VFR BLD CALC: 34.2 % — LOW (ref 34.5–45)
HGB BLD-MCNC: 11.2 G/DL — LOW (ref 11.5–15.5)
IMM GRANULOCYTES NFR BLD AUTO: 0.2 % — SIGNIFICANT CHANGE UP (ref 0–0.9)
LYMPHOCYTES # BLD AUTO: 2.13 K/UL — SIGNIFICANT CHANGE UP (ref 1–3.3)
LYMPHOCYTES # BLD AUTO: 39.6 % — SIGNIFICANT CHANGE UP (ref 13–44)
MCHC RBC-ENTMCNC: 26.8 PG — LOW (ref 27–34)
MCHC RBC-ENTMCNC: 32.7 GM/DL — SIGNIFICANT CHANGE UP (ref 32–36)
MCV RBC AUTO: 81.8 FL — SIGNIFICANT CHANGE UP (ref 80–100)
MONOCYTES # BLD AUTO: 0.43 K/UL — SIGNIFICANT CHANGE UP (ref 0–0.9)
MONOCYTES NFR BLD AUTO: 8 % — SIGNIFICANT CHANGE UP (ref 2–14)
NEUTROPHILS # BLD AUTO: 2.63 K/UL — SIGNIFICANT CHANGE UP (ref 1.8–7.4)
NEUTROPHILS NFR BLD AUTO: 48.9 % — SIGNIFICANT CHANGE UP (ref 43–77)
PLATELET # BLD AUTO: 284 K/UL — SIGNIFICANT CHANGE UP (ref 150–400)
POTASSIUM SERPL-MCNC: 4.1 MMOL/L — SIGNIFICANT CHANGE UP (ref 3.5–5.3)
POTASSIUM SERPL-SCNC: 4.1 MMOL/L — SIGNIFICANT CHANGE UP (ref 3.5–5.3)
PROT SERPL-MCNC: 6.9 G/DL — SIGNIFICANT CHANGE UP (ref 6.6–8.7)
RBC # BLD: 4.18 M/UL — SIGNIFICANT CHANGE UP (ref 3.8–5.2)
RBC # FLD: 17.5 % — HIGH (ref 10.3–14.5)
SODIUM SERPL-SCNC: 137 MMOL/L — SIGNIFICANT CHANGE UP (ref 135–145)
WBC # BLD: 5.38 K/UL — SIGNIFICANT CHANGE UP (ref 3.8–10.5)
WBC # FLD AUTO: 5.38 K/UL — SIGNIFICANT CHANGE UP (ref 3.8–10.5)

## 2024-05-02 PROCEDURE — 73564 X-RAY EXAM KNEE 4 OR MORE: CPT | Mod: 26,50

## 2024-05-02 PROCEDURE — 99284 EMERGENCY DEPT VISIT MOD MDM: CPT

## 2024-05-02 PROCEDURE — 73130 X-RAY EXAM OF HAND: CPT | Mod: 26,LT

## 2024-05-02 RX ORDER — OXYCODONE HYDROCHLORIDE 5 MG/1
5 TABLET ORAL ONCE
Refills: 0 | Status: DISCONTINUED | OUTPATIENT
Start: 2024-05-02 | End: 2024-05-02

## 2024-05-02 RX ORDER — ACETAMINOPHEN 500 MG
650 TABLET ORAL ONCE
Refills: 0 | Status: COMPLETED | OUTPATIENT
Start: 2024-05-02 | End: 2024-05-02

## 2024-05-02 RX ADMIN — OXYCODONE HYDROCHLORIDE 5 MILLIGRAM(S): 5 TABLET ORAL at 19:10

## 2024-05-02 NOTE — ED PROVIDER NOTE - ATTENDING CONTRIBUTION TO CARE
I, Ciaran Escalona MD, performed the initial face to face bedside interview with this patient regarding history of present illness, review of symptoms and relevant past medical, social and family history.  I completed an independent physical examination.  I was the initial provider who evaluated this patient. I have signed out the follow up of any pending tests (i.e. labs, radiological studies) to the resident.  I have communicated the patient’s plan of care and disposition with the resident.    General: Awake, alert, lying in bed in NAD  HEENT: Normocephalic, atraumatic. No scleral icterus or conjunctival injection. EOMI. Moist mucous membranes. Oropharynx clear.   Neck:. Soft and supple.  Cardiac: RRR, Peripheral pulses 2+ and symmetric. No LE edema.  Resp: Lungs CTAB. No accessory muscle use  Abd: Soft, non-tender, non-distended. No guarding, rebound, or rigidity.  Back: Spine midline and non-tender.   Skin: Scrape to L palm, scrapes to R knee. Otherwise, No rashes, abrasions, or lacerations.  Neuro: AO x 4. Moves all extremities symmetrically. Motor strength and sensation grossly intact.  Psych: Appropriate mood and affect

## 2024-05-02 NOTE — ED PROVIDER NOTE - PATIENT PORTAL LINK FT
You can access the FollowMyHealth Patient Portal offered by [facility] by registering at the following website: [email or web address]. By joining [facility]’s FollowMyHealth portal, you will also be able to view your health information using other applications (apps) compatible with our system.

## 2024-05-02 NOTE — ED PROVIDER NOTE - NSFOLLOWUPINSTRUCTIONS_ED_ALL_ED_FT
Please follow up with your neurologist within 1 week.    You were seen in the emergency room following a fall. Xrays have been performed on your hand and knees which were negative for any acute injury. Please follow up with your primary care provider within 1 week.    Return to the emergency room for any of the following: sudden severe headache, chest pain, shortness of breath, confusion, weakness worse on one side than the other, or any new or worsening symptoms.    Seizure    A seizure is abnormal electrical activity in the brain; the specific cause may or may not be found. Prior to a seizure you may experience a warning sensation (aura) that may include fear, nausea, dizziness, and visual changes such as flashing lights of spots. Common symptoms during the seizure may include an altered mental status, rhythmic jerking movements, drooling, grunting, loss of bladder or bowel control, or tongue biting. After a seizure, you may feel confused and sleepy.     Do not swim, drive, operate machinery, or engage in any risky activity during which a seizure could cause further injury to you or others. Teach friends and family what to do if you HAVE a seizure which includes laying you on the ground with your head on a cushion and turning you to the side to keep your breathing passages clear in case of vomiting.    SEEK IMMEDIATE MEDICAL CARE IF YOU HAVE ANY OF THE FOLLOWING SYMPTOMS: seizure lasting over 5 minutes, not waking up or persistent altered mental status after the seizure, or more frequent or worsening seizures.

## 2024-05-02 NOTE — ED PROVIDER NOTE - CLINICAL SUMMARY MEDICAL DECISION MAKING FREE TEXT BOX
33-year-old female history of hypertension, neuropathy, depression, seizures on Keppra 1 g twice daily presents s/p trip and fall today complaining of pain.  Patient states she was that she had a seizure this morning, went to court and when she was coming back from work she "overdid it "and tripped and fell.  Patient complaining of pain to left hand and bilateral knees      pending imaging to assess for acute fractures. Otherwise, pt states she has follow up with her neurologist and can be dc home.

## 2024-05-02 NOTE — ED PROVIDER NOTE - OBJECTIVE STATEMENT
33-year-old female history of hypertension, neuropathy, depression, seizures on Keppra 1 g twice daily presents s/p trip and fall today complaining of pain.  Patient states she was that she had a seizure this morning, went to court and when she was coming back from work she "overdid it "and tripped and fell.  Denies hitting head or LOC. Patient complaining of pain to left hand and bilateral knees. No fever/chills, No headache, No photophobia/eye pain/changes in vision, No ear pain/sore throat/dysphagia, No chest pain/palpitations, no SOB/cough/wheeze/stridor, No abdominal pain, No N/V/D, no dysuria/frequency/discharge, No neck/back pain, no rash, no changes in neurological status/function. No travel, No sick contacts. Not taking blood thinners.

## 2024-05-02 NOTE — ED PROVIDER NOTE - CARE PROVIDER_API CALL
Todd Kinney  Neurology  [address] [place] [address], [facility] [address]  [place] [address]  Phone: [phone]  Fax: [phone]  Follow Up Time: 7-10 Days

## 2024-05-02 NOTE — ED PROVIDER NOTE - PHYSICAL EXAMINATION
General: Awake, alert, lying in bed in NAD  HEENT: Normocephalic, atraumatic. No scleral icterus or conjunctival injection. EOMI. Moist mucous membranes. Oropharynx clear.   Neck:. Soft and supple.  Cardiac: RRR, Peripheral pulses 2+ and symmetric. No LE edema.  Resp: Lungs CTAB. No accessory muscle use  Abd: Soft, non-tender, non-distended. No guarding, rebound, or rigidity.  Back: Spine midline and non-tender.   Skin: Scrape to L palm, scrapes to R knee. Otherwise, No rashes, abrasions, or lacerations.  Neuro: AO x 4. Moves all extremities symmetrically. Motor strength and sensation grossly intact.  Psych: Appropriate mood and affect

## 2024-05-02 NOTE — ED ADULT TRIAGE NOTE - AS HEIGHT TYPE
[facility] Progress Note    Date: 2022    Name: Greta Ambriz              MRN: [id number]              : 1950    Chemotherapy Cycle: C2D1      R-chop (Rituxan,Cytoxan, Adriamycin, Vincristine)   Prednisone po (Home days 1-5)    Ms. Lorraine Ingram was assessed and education was provided. Ms. Lorraine Ingram arrived ambulatory using cane accompanied by her daughter. Ms. Lorraine Ingram speaks very little english per patient and daughter. Translation services being used via ipad. Treatment education reviewed with Ms. Lorraine Ingram and daughter. Pt reports headache and fatigue after previous treatment, adequate hydration and nutrition encouraged. Ms. Harris Arana vitals were reviewed. Visit Vitals  BP (!) 146/71   Pulse 66   Resp 16   Wt 74.8 kg (165 lb)   SpO2 99%   BMI 33.33 kg/m²       Lab results were obtained and reviewed dated 7/15/22. EF 60-65 % on echo dated 22. Troponin WNL. Mediport accessed with 20 gauge 0.75 inch needle without complications. Port flushes without resistance and positive brisk blood return noted. Paper tape and gauze applied as dressing, pt reports \"blistering and scabbing\" from previous dressing. Treatment initiated per orders. NS  ml bolus initiated. NS infusing IV 100ml/hr. Pre-medications (Tylenol 650 mg po, Benadryl 50 mg IV) were administered as ordered. .     Rituxan 660 mg was initiated at 100 mg/hr (50 ml/hr) for 30 minutes, increased by 50ml every 30 minutes to max rate of  200 ml/hr per orders without complications. Pt tolerates well, is resting comfortably. Port flushed and blood return reverified. Pre-medications (Emend 150 mg IV, Aloxi 0.25 mg IV) were administered as ordered and after 30 minutes next chemo initiated per orders. Cytoxan 700 mg IV was infused at 557 ml/hr without complications or reaction. Port flushed and blood return reverified.     Adriamycin 44 mg IV push over 10 minutes per orders without complications or s/s of reaction. Port flushed and blood return reverified. Vincristine 1 mg IV was infused @ 300 ml/hr per orders without complications or s/s of reaction. Neulasta 6 mg on-body injector applied to patient's. Instructions reviewed with patient and daughter on monitoring and when to remove device. Patient and daughter verbalized understanding. Port flushed and de-accessed per orders, per protocol without complications. Band aid applied to site. Ms. Rach Maier tolerated infusions, and had no complaints at this time. Ms. Rach Maier was discharged from [place] [id number] in stable condition at 1520. She is to return on 7/15/22 at 7/18/22 for her next appointment.     Jarrod Reynolds RN  July 18, 2022  11:13 AM stated

## 2024-05-02 NOTE — ED ADULT TRIAGE NOTE - CHIEF COMPLAINT QUOTE
Pt BIBA s/p trip and fall c/o pain to entire body. Pt states that she had a seizure this morning and has been tired. As she was on her way to court she "overdid it" and fell. Has seizure history

## 2024-05-03 PROCEDURE — 99284 EMERGENCY DEPT VISIT MOD MDM: CPT

## 2024-05-03 PROCEDURE — 80053 COMPREHEN METABOLIC PANEL: CPT

## 2024-05-03 PROCEDURE — 36415 COLL VENOUS BLD VENIPUNCTURE: CPT

## 2024-05-03 PROCEDURE — 85025 COMPLETE CBC W/AUTO DIFF WBC: CPT

## 2024-05-03 PROCEDURE — 84702 CHORIONIC GONADOTROPIN TEST: CPT

## 2024-05-03 PROCEDURE — 73564 X-RAY EXAM KNEE 4 OR MORE: CPT

## 2024-05-03 PROCEDURE — 73130 X-RAY EXAM OF HAND: CPT

## 2024-11-22 ENCOUNTER — EMERGENCY (EMERGENCY)
Facility: HOSPITAL | Age: 34
LOS: 1 days | Discharge: OTHER INSTITUTION | End: 2024-11-22
Attending: STUDENT IN AN ORGANIZED HEALTH CARE EDUCATION/TRAINING PROGRAM
Payer: MEDICARE

## 2024-11-22 VITALS
OXYGEN SATURATION: 97 % | DIASTOLIC BLOOD PRESSURE: 97 MMHG | WEIGHT: 205.03 LBS | HEART RATE: 105 BPM | RESPIRATION RATE: 18 BRPM | SYSTOLIC BLOOD PRESSURE: 174 MMHG | TEMPERATURE: 99 F

## 2024-11-22 LAB
ANION GAP SERPL CALC-SCNC: 16 MMOL/L — SIGNIFICANT CHANGE UP (ref 5–17)
APAP SERPL-MCNC: <3 UG/ML — LOW (ref 10–26)
BASOPHILS # BLD AUTO: 0.03 K/UL — SIGNIFICANT CHANGE UP (ref 0–0.2)
BASOPHILS NFR BLD AUTO: 0.5 % — SIGNIFICANT CHANGE UP (ref 0–2)
BUN SERPL-MCNC: 7 MG/DL — LOW (ref 8–20)
CALCIUM SERPL-MCNC: 9.6 MG/DL — SIGNIFICANT CHANGE UP (ref 8.4–10.5)
CHLORIDE SERPL-SCNC: 101 MMOL/L — SIGNIFICANT CHANGE UP (ref 96–108)
CO2 SERPL-SCNC: 22 MMOL/L — SIGNIFICANT CHANGE UP (ref 22–29)
CREAT SERPL-MCNC: 0.88 MG/DL — SIGNIFICANT CHANGE UP (ref 0.5–1.3)
EGFR: 87 ML/MIN/1.73M2 — SIGNIFICANT CHANGE UP
EOSINOPHIL # BLD AUTO: 0.03 K/UL — SIGNIFICANT CHANGE UP (ref 0–0.5)
EOSINOPHIL NFR BLD AUTO: 0.5 % — SIGNIFICANT CHANGE UP (ref 0–6)
ETHANOL SERPL-MCNC: <10 MG/DL — SIGNIFICANT CHANGE UP (ref 0–9)
FLUAV AG NPH QL: SIGNIFICANT CHANGE UP
FLUBV AG NPH QL: SIGNIFICANT CHANGE UP
GLUCOSE SERPL-MCNC: 90 MG/DL — SIGNIFICANT CHANGE UP (ref 70–99)
HCG SERPL-ACNC: <4 MIU/ML — SIGNIFICANT CHANGE UP
HCT VFR BLD CALC: 33.5 % — LOW (ref 34.5–45)
HGB BLD-MCNC: 11.1 G/DL — LOW (ref 11.5–15.5)
IMM GRANULOCYTES NFR BLD AUTO: 0.2 % — SIGNIFICANT CHANGE UP (ref 0–0.9)
LYMPHOCYTES # BLD AUTO: 1.67 K/UL — SIGNIFICANT CHANGE UP (ref 1–3.3)
LYMPHOCYTES # BLD AUTO: 27.6 % — SIGNIFICANT CHANGE UP (ref 13–44)
MCHC RBC-ENTMCNC: 26.3 PG — LOW (ref 27–34)
MCHC RBC-ENTMCNC: 33.1 G/DL — SIGNIFICANT CHANGE UP (ref 32–36)
MCV RBC AUTO: 79.4 FL — LOW (ref 80–100)
MONOCYTES # BLD AUTO: 0.6 K/UL — SIGNIFICANT CHANGE UP (ref 0–0.9)
MONOCYTES NFR BLD AUTO: 9.9 % — SIGNIFICANT CHANGE UP (ref 2–14)
NEUTROPHILS # BLD AUTO: 3.71 K/UL — SIGNIFICANT CHANGE UP (ref 1.8–7.4)
NEUTROPHILS NFR BLD AUTO: 61.3 % — SIGNIFICANT CHANGE UP (ref 43–77)
PLATELET # BLD AUTO: 299 K/UL — SIGNIFICANT CHANGE UP (ref 150–400)
POTASSIUM SERPL-MCNC: 3.8 MMOL/L — SIGNIFICANT CHANGE UP (ref 3.5–5.3)
POTASSIUM SERPL-SCNC: 3.8 MMOL/L — SIGNIFICANT CHANGE UP (ref 3.5–5.3)
RBC # BLD: 4.22 M/UL — SIGNIFICANT CHANGE UP (ref 3.8–5.2)
RBC # FLD: 17.2 % — HIGH (ref 10.3–14.5)
RSV RNA NPH QL NAA+NON-PROBE: SIGNIFICANT CHANGE UP
SALICYLATES SERPL-MCNC: 2.3 MG/DL — LOW (ref 10–20)
SARS-COV-2 RNA SPEC QL NAA+PROBE: SIGNIFICANT CHANGE UP
SODIUM SERPL-SCNC: 139 MMOL/L — SIGNIFICANT CHANGE UP (ref 135–145)
WBC # BLD: 6.05 K/UL — SIGNIFICANT CHANGE UP (ref 3.8–10.5)
WBC # FLD AUTO: 6.05 K/UL — SIGNIFICANT CHANGE UP (ref 3.8–10.5)

## 2024-11-22 PROCEDURE — 99285 EMERGENCY DEPT VISIT HI MDM: CPT

## 2024-11-22 NOTE — ED ADULT TRIAGE NOTE - CHIEF COMPLAINT QUOTE
BIBA from home for hypoglycemia. FS 50 upon arrival given 15 mg glucose orally, rpt FS 62. FS in triage was 100. Pt stated she hasn't been eating due to depression, denies SI/HI at this time. Changed into yellow gown

## 2024-11-22 NOTE — ED ADULT TRIAGE NOTE - PATIENT'S PREFERRED PRONOUN
Pt also requesting Iron supplement & Low Dose Asprin to be ordered via [facility] if possible  Pt stated if an order is sent to pharmacy, her insurance will cover 
Her/She

## 2024-11-23 VITALS — DIASTOLIC BLOOD PRESSURE: 93 MMHG | SYSTOLIC BLOOD PRESSURE: 155 MMHG

## 2024-11-23 DIAGNOSIS — F33.3 MAJOR DEPRESSIVE DISORDER, RECURRENT, SEVERE WITH PSYCHOTIC SYMPTOMS: ICD-10-CM

## 2024-11-23 LAB
AMPHET UR-MCNC: POSITIVE
APPEARANCE UR: ABNORMAL
BACTERIA # UR AUTO: ABNORMAL /HPF
BARBITURATES UR SCN-MCNC: NEGATIVE — SIGNIFICANT CHANGE UP
BENZODIAZ UR-MCNC: NEGATIVE — SIGNIFICANT CHANGE UP
BILIRUB UR-MCNC: NEGATIVE — SIGNIFICANT CHANGE UP
CAST: 5 /LPF — HIGH (ref 0–4)
COCAINE METAB.OTHER UR-MCNC: NEGATIVE — SIGNIFICANT CHANGE UP
COLOR SPEC: YELLOW — SIGNIFICANT CHANGE UP
DIFF PNL FLD: NEGATIVE — SIGNIFICANT CHANGE UP
FENTANYL UR QL SCN: NEGATIVE — SIGNIFICANT CHANGE UP
GLUCOSE BLDC GLUCOMTR-MCNC: 82 MG/DL — SIGNIFICANT CHANGE UP (ref 70–99)
GLUCOSE UR QL: NEGATIVE MG/DL — SIGNIFICANT CHANGE UP
KETONES UR-MCNC: 15 MG/DL
LEUKOCYTE ESTERASE UR-ACNC: ABNORMAL
METHADONE UR-MCNC: NEGATIVE — SIGNIFICANT CHANGE UP
MUCOUS THREADS # UR AUTO: PRESENT
NITRITE UR-MCNC: NEGATIVE — SIGNIFICANT CHANGE UP
OPIATES UR-MCNC: NEGATIVE — SIGNIFICANT CHANGE UP
PCP SPEC-MCNC: SIGNIFICANT CHANGE UP
PCP UR-MCNC: NEGATIVE — SIGNIFICANT CHANGE UP
PH UR: 6.5 — SIGNIFICANT CHANGE UP (ref 5–8)
PROT UR-MCNC: SIGNIFICANT CHANGE UP MG/DL
RBC CASTS # UR COMP ASSIST: 1 /HPF — SIGNIFICANT CHANGE UP (ref 0–4)
SP GR SPEC: 1.02 — SIGNIFICANT CHANGE UP (ref 1–1.03)
SQUAMOUS # UR AUTO: 17 /HPF — HIGH (ref 0–5)
THC UR QL: NEGATIVE — SIGNIFICANT CHANGE UP
UROBILINOGEN FLD QL: 1 MG/DL — SIGNIFICANT CHANGE UP (ref 0.2–1)
WBC UR QL: 7 /HPF — HIGH (ref 0–5)

## 2024-11-23 PROCEDURE — 96372 THER/PROPH/DIAG INJ SC/IM: CPT | Mod: XU

## 2024-11-23 PROCEDURE — 87637 SARSCOV2&INF A&B&RSV AMP PRB: CPT

## 2024-11-23 PROCEDURE — 96374 THER/PROPH/DIAG INJ IV PUSH: CPT

## 2024-11-23 PROCEDURE — 90792 PSYCH DIAG EVAL W/MED SRVCS: CPT

## 2024-11-23 PROCEDURE — 93005 ELECTROCARDIOGRAM TRACING: CPT

## 2024-11-23 PROCEDURE — 80048 BASIC METABOLIC PNL TOTAL CA: CPT

## 2024-11-23 PROCEDURE — 80307 DRUG TEST PRSMV CHEM ANLYZR: CPT

## 2024-11-23 PROCEDURE — 81001 URINALYSIS AUTO W/SCOPE: CPT

## 2024-11-23 PROCEDURE — 36415 COLL VENOUS BLD VENIPUNCTURE: CPT

## 2024-11-23 PROCEDURE — 85025 COMPLETE CBC W/AUTO DIFF WBC: CPT

## 2024-11-23 PROCEDURE — 99285 EMERGENCY DEPT VISIT HI MDM: CPT | Mod: 25

## 2024-11-23 PROCEDURE — 84702 CHORIONIC GONADOTROPIN TEST: CPT

## 2024-11-23 PROCEDURE — G0378: CPT

## 2024-11-23 PROCEDURE — 99223 1ST HOSP IP/OBS HIGH 75: CPT

## 2024-11-23 PROCEDURE — 93010 ELECTROCARDIOGRAM REPORT: CPT | Mod: 76

## 2024-11-23 PROCEDURE — 82962 GLUCOSE BLOOD TEST: CPT

## 2024-11-23 RX ORDER — HALOPERIDOL 2 MG
5 TABLET ORAL EVERY 6 HOURS
Refills: 0 | Status: DISCONTINUED | OUTPATIENT
Start: 2024-11-24 | End: 2024-12-06

## 2024-11-23 RX ORDER — HALOPERIDOL DECANOATE 50 MG/ML
5 INJECTION INTRAMUSCULAR ONCE
Refills: 0 | Status: COMPLETED | OUTPATIENT
Start: 2024-11-23 | End: 2024-11-23

## 2024-11-23 RX ORDER — AMLODIPINE BESYLATE 10 MG
5 TABLET ORAL ONCE
Refills: 0 | Status: COMPLETED | OUTPATIENT
Start: 2024-11-23 | End: 2024-11-23

## 2024-11-23 RX ORDER — LORAZEPAM 2 MG
1 TABLET ORAL ONCE
Refills: 0 | Status: DISCONTINUED | OUTPATIENT
Start: 2024-11-23 | End: 2024-11-23

## 2024-11-23 RX ORDER — DIPHENHYDRAMINE HCL 12.5MG/5ML
50 ELIXIR ORAL ONCE
Refills: 0 | Status: COMPLETED | OUTPATIENT
Start: 2024-11-23 | End: 2024-11-23

## 2024-11-23 RX ORDER — QUETIAPINE FUMARATE 200 MG/1
50 TABLET ORAL AT BEDTIME
Refills: 0 | Status: DISCONTINUED | OUTPATIENT
Start: 2024-11-23 | End: 2024-11-23

## 2024-11-23 RX ORDER — RISPERIDONE 4 MG/1
1 TABLET ORAL AT BEDTIME
Refills: 0 | Status: DISCONTINUED | OUTPATIENT
Start: 2024-11-24 | End: 2024-11-27

## 2024-11-23 RX ORDER — ACETAMINOPHEN 500 MG
975 TABLET ORAL ONCE
Refills: 0 | Status: ACTIVE | OUTPATIENT
Start: 2024-11-23 | End: 2024-11-23

## 2024-11-23 RX ORDER — CLONIDINE HYDROCHLORIDE 0.2 MG/1
0.2 TABLET ORAL ONCE
Refills: 0 | Status: COMPLETED | OUTPATIENT
Start: 2024-11-23 | End: 2024-11-23

## 2024-11-23 RX ORDER — LORAZEPAM 2 MG
2 TABLET ORAL ONCE
Refills: 0 | Status: DISCONTINUED | OUTPATIENT
Start: 2024-11-23 | End: 2024-11-23

## 2024-11-23 RX ORDER — DIPHENHYDRAMINE HCL 25 MG
50 CAPSULE ORAL EVERY 6 HOURS
Refills: 0 | Status: DISCONTINUED | OUTPATIENT
Start: 2024-11-24 | End: 2024-12-06

## 2024-11-23 RX ORDER — RISPERIDONE 3 MG/1
1 TABLET, FILM COATED ORAL AT BEDTIME
Refills: 0 | Status: ACTIVE | OUTPATIENT
Start: 2024-11-23 | End: 2025-10-22

## 2024-11-23 RX ORDER — LORAZEPAM 2 MG/1
2 TABLET ORAL EVERY 6 HOURS
Refills: 0 | Status: DISCONTINUED | OUTPATIENT
Start: 2024-11-24 | End: 2024-12-01

## 2024-11-23 RX ORDER — KETOROLAC TROMETHAMINE 30 MG/ML
15 INJECTION INTRAMUSCULAR; INTRAVENOUS ONCE
Refills: 0 | Status: DISCONTINUED | OUTPATIENT
Start: 2024-11-23 | End: 2024-11-23

## 2024-11-23 RX ADMIN — CLONIDINE HYDROCHLORIDE 0.2 MILLIGRAM(S): 0.2 TABLET ORAL at 16:44

## 2024-11-23 RX ADMIN — Medication 5 MILLIGRAM(S): at 16:44

## 2024-11-23 RX ADMIN — Medication 50 MILLIGRAM(S): at 11:28

## 2024-11-23 RX ADMIN — Medication 1 MILLIGRAM(S): at 04:28

## 2024-11-23 RX ADMIN — KETOROLAC TROMETHAMINE 15 MILLIGRAM(S): 30 INJECTION INTRAMUSCULAR; INTRAVENOUS at 05:41

## 2024-11-23 RX ADMIN — Medication 2 MILLIGRAM(S): at 11:28

## 2024-11-23 RX ADMIN — RISPERIDONE 1 MILLIGRAM(S): 3 TABLET, FILM COATED ORAL at 21:19

## 2024-11-23 RX ADMIN — HALOPERIDOL DECANOATE 5 MILLIGRAM(S): 50 INJECTION INTRAMUSCULAR at 11:28
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
## 2024-11-23 NOTE — ED BEHAVIORAL HEALTH ASSESSMENT NOTE - OTHER
auditory/visual hallucinations, disorganized pending bed availability mother called EMS at recommendation of mental health team insecure housing, 13 year old daughter lives with her

## 2024-11-23 NOTE — ED BEHAVIORAL HEALTH ASSESSMENT NOTE - NSACTIVEVENT_PSY_ALL_CORE
Triggering events leading to humiliation, shame, and/or despair (e.g., Loss of relationship, financial or health status) (real or anticipated)/Chronic pain or other acute medical condition/Pending incarceration or homelessness/Inadequate social supports

## 2024-11-23 NOTE — ED PROVIDER NOTE - CLINICAL SUMMARY MEDICAL DECISION MAKING FREE TEXT BOX
Ginger Oneill MD, Attending  33-year-old female history of hypertension, neuropathy, anxiety, depression, seizures presents with complaint of not eating and hypoglycemia. pt states she is very depressed and has not eaten for several days due to depression. Denies SI, HI, AVH    Gen: Well appearing in NAD   Head: NC/AT  Neck: trachea midline  Resp:  No distress  Ext: no deformities  Neuro:  A&O appears non focal  Skin:  Warm and dry as visualized  Psych: odd affect, labile mood, anxious.     ddx includes, but is not limited to the following: lower suspicion for acute organic etiology, worsening psychological condition.   plan: medical clearance, psych consult.

## 2024-11-23 NOTE — ED ADULT NURSE NOTE - CAS DISCH TRANSFER METHOD
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      Spoke to family with [employer]  per mom's request, she speaks English and Dad does not.  Per family they were not aware that there was a need for hematology and said there daughter is not ill and they do not want to come for the appointment.  Will reach out to the referring clinic with an update through Epic in basket message.  Will cancel the referral in the active scheduling WQ and if needed in future it will need to be entered again.  
Ambulance
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
## 2024-11-23 NOTE — ED BEHAVIORAL HEALTH ASSESSMENT NOTE - DESCRIPTION
34 yr old female living in shelters and with mother, on disability, minimal social supports irritable secondary to wanting to be d/c    Vital Signs Last 24 Hrs  T(C): 36.7 (11-23-24 @ 08:03), Max: 37.1 (11-22-24 @ 19:00)  T(F): 98 (11-23-24 @ 08:03), Max: 98.8 (11-22-24 @ 19:00)  HR: 99 (11-23-24 @ 08:03) (99 - 105)  BP: 173/108 (11-23-24 @ 08:03) (173/108 - 174/97)  BP(mean): --  RR: 18 (11-23-24 @ 08:03) (18 - 18)  SpO2: 95% (11-23-24 @ 08:03) (95% - 97%) asthma, seizures, enlarged heart, HTN, irregular heart beat

## 2024-11-23 NOTE — ED CDU PROVIDER DISPOSITION NOTE - CLINICAL COURSE
year-old female history of hypertension, neuropathy, anxiety, depression, seizures presents with complaint of not eating and hypoglycemia. pt states she is very depressed and has not eaten for several days due to depression. Patient seen by psych and recommended admission for psychiatric care, danger to self. Otherwise cleared for psychiatric admission. Patient restarted on 5mg amlodipine, 0.2mg clonidine, and 25 mg hctz as per pharmacy record.
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
## 2024-11-23 NOTE — ED ADULT NURSE REASSESSMENT NOTE - NS ED NURSE REASSESS COMMENT FT1
Assumed care of pt from Elvis RAMOS at 0715. Pt sitting up in bed, pt denies chest pain/sob, respirations are even and unlabored. Pt denies pain. NAD.
received report from No COUCH rn and assumed care of pt. pt sitting in bed. lethargic and o x3. breathing even and unlabored. awaiting transfer to [place].
report given to Jose issa from  at [facility].
received report.  received pt in room laying down.  not verbalizing with writer at this time.  awaiting transport to [facility]

## 2024-11-23 NOTE — CHART NOTE - NSCHARTNOTEFT_GEN_A_CORE
SW Note: Psych NP informed SW that plan is for 9.27 inpt psych tx. Legals completed. SW will follow.

## 2024-11-23 NOTE — ED BEHAVIORAL HEALTH ASSESSMENT NOTE - CURRENT MEDICATION
adderall 60mg daily, confirmed in istop  clonidine (dose unknown)  gabapentin (dose unknown)  keppra 500mg BID

## 2024-11-24 ENCOUNTER — INPATIENT (INPATIENT)
Facility: HOSPITAL | Age: 34
LOS: 11 days | Discharge: HOME | DRG: 885 | End: 2024-12-06
Attending: PSYCHIATRY & NEUROLOGY | Admitting: PSYCHIATRY & NEUROLOGY
Payer: MEDICARE

## 2024-11-24 VITALS
HEART RATE: 102 BPM | HEIGHT: 64 IN | DIASTOLIC BLOOD PRESSURE: 91 MMHG | WEIGHT: 190.7 LBS | SYSTOLIC BLOOD PRESSURE: 136 MMHG | RESPIRATION RATE: 16 BRPM | TEMPERATURE: 98 F

## 2024-11-24 DIAGNOSIS — F33.3 MAJOR DEPRESSIVE DISORDER, RECURRENT, SEVERE WITH PSYCHOTIC SYMPTOMS: ICD-10-CM

## 2024-11-24 LAB
CHOLEST SERPL-MCNC: 184 MG/DL — SIGNIFICANT CHANGE UP
HDLC SERPL-MCNC: 51 MG/DL — SIGNIFICANT CHANGE UP
LIPID PNL WITH DIRECT LDL SERPL: 123 MG/DL — HIGH
NON HDL CHOLESTEROL: 133 MG/DL — HIGH
TRIGL SERPL-MCNC: 50 MG/DL — SIGNIFICANT CHANGE UP

## 2024-11-24 PROCEDURE — 82607 VITAMIN B-12: CPT

## 2024-11-24 PROCEDURE — 95711 VEEG 2-12 HR UNMONITORED: CPT

## 2024-11-24 PROCEDURE — 84439 ASSAY OF FREE THYROXINE: CPT

## 2024-11-24 PROCEDURE — 84443 ASSAY THYROID STIM HORMONE: CPT

## 2024-11-24 PROCEDURE — 80076 HEPATIC FUNCTION PANEL: CPT

## 2024-11-24 PROCEDURE — 84481 FREE ASSAY (FT-3): CPT

## 2024-11-24 PROCEDURE — 82746 ASSAY OF FOLIC ACID SERUM: CPT

## 2024-11-24 PROCEDURE — 73030 X-RAY EXAM OF SHOULDER: CPT | Mod: LT

## 2024-11-24 PROCEDURE — 95700 EEG CONT REC W/VID EEG TECH: CPT

## 2024-11-24 PROCEDURE — 83036 HEMOGLOBIN GLYCOSYLATED A1C: CPT

## 2024-11-24 PROCEDURE — 36415 COLL VENOUS BLD VENIPUNCTURE: CPT

## 2024-11-24 PROCEDURE — 80061 LIPID PANEL: CPT

## 2024-11-24 PROCEDURE — 86780 TREPONEMA PALLIDUM: CPT

## 2024-11-24 RX ORDER — CLONIDINE HYDROCHLORIDE 0.3 MG/1
0.2 TABLET ORAL
Refills: 0 | Status: DISCONTINUED | OUTPATIENT
Start: 2024-11-24 | End: 2024-12-06

## 2024-11-24 RX ORDER — GABAPENTIN 300 MG/1
800 CAPSULE ORAL THREE TIMES A DAY
Refills: 0 | Status: DISCONTINUED | OUTPATIENT
Start: 2024-11-24 | End: 2024-12-06

## 2024-11-24 RX ORDER — GABAPENTIN 300 MG/1
800 CAPSULE ORAL ONCE
Refills: 0 | Status: COMPLETED | OUTPATIENT
Start: 2024-11-24 | End: 2024-11-24

## 2024-11-24 RX ORDER — AMLODIPINE BESYLATE 10 MG/1
5 TABLET ORAL DAILY
Refills: 0 | Status: DISCONTINUED | OUTPATIENT
Start: 2024-11-24 | End: 2024-12-06

## 2024-11-24 RX ORDER — DULOXETINE HCL 60 MG
20 CAPSULE,DELAYED RELEASE (ENTERIC COATED) ORAL DAILY
Refills: 0 | Status: DISCONTINUED | OUTPATIENT
Start: 2024-11-24 | End: 2024-12-06

## 2024-11-24 RX ORDER — ACETAMINOPHEN 500MG 500 MG/1
650 TABLET, COATED ORAL EVERY 6 HOURS
Refills: 0 | Status: DISCONTINUED | OUTPATIENT
Start: 2024-11-24 | End: 2024-11-26

## 2024-11-24 RX ADMIN — GABAPENTIN 800 MILLIGRAM(S): 300 CAPSULE ORAL at 20:16

## 2024-11-24 RX ADMIN — AMLODIPINE BESYLATE 5 MILLIGRAM(S): 10 TABLET ORAL at 11:15

## 2024-11-24 RX ADMIN — CLONIDINE HYDROCHLORIDE 0.2 MILLIGRAM(S): 0.3 TABLET ORAL at 13:23

## 2024-11-24 RX ADMIN — GABAPENTIN 800 MILLIGRAM(S): 300 CAPSULE ORAL at 11:15

## 2024-11-24 RX ADMIN — CLONIDINE HYDROCHLORIDE 0.2 MILLIGRAM(S): 0.3 TABLET ORAL at 20:15

## 2024-11-24 RX ADMIN — Medication 20 MILLIGRAM(S): at 20:29

## 2024-11-24 RX ADMIN — RISPERIDONE 1 MILLIGRAM(S): 4 TABLET ORAL at 20:16

## 2024-11-24 RX ADMIN — ACETAMINOPHEN 500MG 650 MILLIGRAM(S): 500 TABLET, COATED ORAL at 17:58

## 2024-11-24 RX ADMIN — ACETAMINOPHEN 500MG 650 MILLIGRAM(S): 500 TABLET, COATED ORAL at 19:00

## 2024-11-24 NOTE — BH INPATIENT PSYCHIATRY ASSESSMENT NOTE - OTHER PAST PSYCHIATRIC HISTORY (INCLUDE DETAILS REGARDING ONSET, COURSE OF ILLNESS, INPATIENT/OUTPATIENT TREATMENT)
1 prior IPP > 17 yrs ago, presently homeless and staying in various shelters as well as most recently at her mother's home, on disability, past psychiatric history of anxiety, depression, post-traumatic stress disorder, and Attention-Deficit/Hyperactivity Disorder as per chart review; history of ED visit for hallucinations in june 2023 as per chart review

## 2024-11-24 NOTE — BH INPATIENT PSYCHIATRY ASSESSMENT NOTE - NSBHMETABOLIC_PSY_ALL_CORE_FT
BMI: BMI (kg/m2): 32.7 (11-24-24 @ 01:11)  HbA1c:   Glucose: POCT Blood Glucose.: 82 mg/dL (11-23-24 @ 16:18)    BP: 151/93 (11-24-24 @ 08:13) (136/91 - 151/93)Vital Signs Last 24 Hrs  T(C): 37 (11-24-24 @ 08:13), Max: 37 (11-23-24 @ 14:29)  T(F): 98.6 (11-24-24 @ 08:13), Max: 98.6 (11-23-24 @ 14:29)  HR: 98 (11-24-24 @ 08:13) (88 - 102)  BP: 151/93 (11-24-24 @ 08:13) (136/91 - 188/139)  BP(mean): --  RR: 16 (11-24-24 @ 01:11) (16 - 16)  SpO2: 100% (11-23-24 @ 14:29) (100% - 100%)      Lipid Panel: Date/Time: 11-24-24 @ 07:26  Cholesterol, Serum: 184  LDL Cholesterol Calculated: 123  HDL Cholesterol, Serum: 51  Total Cholesterol/HDL Ration Measurement: --  Triglycerides, Serum: 50   BMI: BMI (kg/m2): 32.7 (11-24-24 @ 01:11)  HbA1c:   Glucose: POCT Blood Glucose.: 82 mg/dL (11-23-24 @ 16:18)    BP: 151/93 (11-24-24 @ 08:13) (136/91 - 151/93)Vital Signs Last 24 Hrs  T(C): 37 (11-24-24 @ 08:13), Max: 37 (11-24-24 @ 08:13)  T(F): 98.6 (11-24-24 @ 08:13), Max: 98.6 (11-24-24 @ 08:13)  HR: 98 (11-24-24 @ 08:13) (88 - 102)  BP: 151/93 (11-24-24 @ 08:13) (136/91 - 155/93)  BP(mean): --  RR: 16 (11-24-24 @ 01:11) (16 - 16)  SpO2: --      Lipid Panel: Date/Time: 11-24-24 @ 07:26  Cholesterol, Serum: 184  LDL Cholesterol Calculated: 123  HDL Cholesterol, Serum: 51  Total Cholesterol/HDL Ration Measurement: --  Triglycerides, Serum: 50   BMI: BMI (kg/m2): 32.7 (11-24-24 @ 01:11)  HbA1c:   Glucose: POCT Blood Glucose.: 82 mg/dL (11-23-24 @ 16:18)    BP: 151/93 (11-24-24 @ 08:13) (136/91 - 151/93)Vital Signs Last 24 Hrs  T(C): 37 (11-24-24 @ 08:13), Max: 37 (11-24-24 @ 08:13)  T(F): 98.6 (11-24-24 @ 08:13), Max: 98.6 (11-24-24 @ 08:13)  HR: 98 (11-24-24 @ 08:13) (98 - 102)  BP: 151/93 (11-24-24 @ 08:13) (136/91 - 155/93)  BP(mean): --  RR: 16 (11-24-24 @ 01:11) (16 - 16)  SpO2: --      Lipid Panel: Date/Time: 11-24-24 @ 07:26  Cholesterol, Serum: 184  LDL Cholesterol Calculated: 123  HDL Cholesterol, Serum: 51  Total Cholesterol/HDL Ration Measurement: --  Triglycerides, Serum: 50

## 2024-11-24 NOTE — BH CHART NOTE - NSEVENTNOTEFT_PSY_ALL_CORE
I have reviewed the information from the ED Psychiatric assessment, communicated with the sending psychiatric team (via handoff from previous shift attending), and interviewed the patient. I have confirmed that the patient is in need of care and treatment in an inpatient psychiatric hospital since the patient poses a substantial threat of harm to self or others as a result of their mental illness. I have afforded the patient the opportunity to ask questions regarding their inpatient psychiatry status and rights. There is NO clinical indication at this time for constant observation for suicidality, self-harm, hypersexuality, aggression, falls, or elopement. Patient denied current SI/HI/AH/VH at the time of assessment.

## 2024-11-24 NOTE — BH INPATIENT PSYCHIATRY ASSESSMENT NOTE - NSBHCHARTREVIEWVS_PSY_A_CORE FT
Vital Signs Last 24 Hrs  T(C): 37 (11-24-24 @ 08:13), Max: 37 (11-23-24 @ 14:29)  T(F): 98.6 (11-24-24 @ 08:13), Max: 98.6 (11-23-24 @ 14:29)  HR: 98 (11-24-24 @ 08:13) (88 - 102)  BP: 151/93 (11-24-24 @ 08:13) (136/91 - 188/139)  BP(mean): --  RR: 16 (11-24-24 @ 01:11) (16 - 16)  SpO2: 100% (11-23-24 @ 14:29) (100% - 100%)     Vital Signs Last 24 Hrs  T(C): 37 (11-24-24 @ 08:13), Max: 37 (11-24-24 @ 08:13)  T(F): 98.6 (11-24-24 @ 08:13), Max: 98.6 (11-24-24 @ 08:13)  HR: 98 (11-24-24 @ 08:13) (88 - 102)  BP: 151/93 (11-24-24 @ 08:13) (136/91 - 155/93)  BP(mean): --  RR: 16 (11-24-24 @ 01:11) (16 - 16)  SpO2: --     Vital Signs Last 24 Hrs  T(C): 37 (11-24-24 @ 08:13), Max: 37 (11-24-24 @ 08:13)  T(F): 98.6 (11-24-24 @ 08:13), Max: 98.6 (11-24-24 @ 08:13)  HR: 98 (11-24-24 @ 08:13) (98 - 102)  BP: 151/93 (11-24-24 @ 08:13) (136/91 - 155/93)  BP(mean): --  RR: 16 (11-24-24 @ 01:11) (16 - 16)  SpO2: --
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
## 2024-11-24 NOTE — BH INPATIENT PSYCHIATRY ASSESSMENT NOTE - DETAILS
PTSD hx, details deferred at this time Patient denies any SI today Per ED documentation, mother activated CPS for pt's 13 year old daughter

## 2024-11-24 NOTE — BH INPATIENT PSYCHIATRY ASSESSMENT NOTE - OTHER
unable to assess due to poverty of speech   irritable and guarded re: why she's at the hospital "the same"

## 2024-11-24 NOTE — BH INPATIENT PSYCHIATRY ASSESSMENT NOTE - RISK ASSESSMENT
Risk factors: undomiciled, psychosis, psychosocial stressors  Protective factors: in outpt treatment, no recent IPP admissions,  Risk factors: undomiciled, psychosis, psychosocial stressors  Protective factors: in outpt treatment, no recent IPP admissions, not endorsing SI

## 2024-11-24 NOTE — BH INPATIENT PSYCHIATRY ASSESSMENT NOTE - NSBHCRANIAL_PSY_ALL_CORE
Normal speech (IX, X, XII)/Hearing intact (VIII) normal sinus rhythm, Normal axis, Normal MA interval and QRS complex. There are no acute ischemic ST or T-wave changes.

## 2024-11-24 NOTE — BH INPATIENT PSYCHIATRY ASSESSMENT NOTE - DESCRIPTION
presently homeless and staying in various shelters as well as most recently at her mother's home, on disability, has 13 y.o. daughter

## 2024-11-24 NOTE — BH PATIENT PROFILE - FALL HARM RISK - UNIVERSAL INTERVENTIONS
Bed in lowest position, wheels locked, appropriate side rails in place/Call bell, personal items and telephone in reach/Instruct patient to call for assistance before getting out of bed or chair/Non-slip footwear when patient is out of bed/[place] to call system/Physically safe environment - no spills, clutter or unnecessary equipment/Purposeful Proactive Rounding/Room/bathroom lighting operational, light cord in reach
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
## 2024-11-24 NOTE — BH INPATIENT PSYCHIATRY ASSESSMENT NOTE - CURRENT MEDICATION
MEDICATIONS  (STANDING):  amLODIPine   Tablet 5 milliGRAM(s) Oral daily  cloNIDine 0.2 milliGRAM(s) Oral two times a day  gabapentin 800 milliGRAM(s) Oral once  risperiDONE   Tablet 1 milliGRAM(s) Oral at bedtime    MEDICATIONS  (PRN):  diphenhydrAMINE 50 milliGRAM(s) Oral every 6 hours PRN Combative behavior  haloperidol     Tablet 5 milliGRAM(s) Oral every 6 hours PRN agitation  LORazepam     Tablet 2 milliGRAM(s) Oral every 6 hours PRN Agitation   MEDICATIONS  (STANDING):  amLODIPine   Tablet 5 milliGRAM(s) Oral daily  cloNIDine 0.2 milliGRAM(s) Oral two times a day  DULoxetine 20 milliGRAM(s) Oral daily  gabapentin 800 milliGRAM(s) Oral three times a day  risperiDONE   Tablet 1 milliGRAM(s) Oral at bedtime    MEDICATIONS  (PRN):  diphenhydrAMINE 50 milliGRAM(s) Oral every 6 hours PRN Combative behavior  haloperidol     Tablet 5 milliGRAM(s) Oral every 6 hours PRN agitation  LORazepam     Tablet 2 milliGRAM(s) Oral every 6 hours PRN Agitation

## 2024-11-24 NOTE — BH PATIENT PROFILE - HOME MEDICATIONS
Adderall 30 mg oral tablet , 1 tab(s) orally 2 times a day MDD: 2  Xanax 0.5 mg oral tablet , 1 tab(s) orally 2 times a day MDD: 2  predniSONE 20 mg oral tablet , 2 tab(s) orally once a day   PriLOSEC  Norvasc  Keppra 500 mg oral tablet , 1 tab(s) orally 2 times a day

## 2024-11-25 LAB
A1C WITH ESTIMATED AVERAGE GLUCOSE RESULT: 5.3 % — SIGNIFICANT CHANGE UP (ref 4–5.6)
ESTIMATED AVERAGE GLUCOSE: 105 MG/DL — SIGNIFICANT CHANGE UP (ref 68–114)

## 2024-11-25 PROCEDURE — 99232 SBSQ HOSP IP/OBS MODERATE 35: CPT

## 2024-11-25 RX ORDER — DULOXETINE HCL 60 MG
1 CAPSULE,DELAYED RELEASE (ENTERIC COATED) ORAL
Refills: 0 | DISCHARGE

## 2024-11-25 RX ORDER — CLONIDINE HYDROCHLORIDE 0.3 MG/1
1 TABLET ORAL
Refills: 0 | DISCHARGE

## 2024-11-25 RX ORDER — LAMOTRIGINE 50 MG/1
1 TABLET, EXTENDED RELEASE ORAL
Refills: 0 | DISCHARGE

## 2024-11-25 RX ORDER — GABAPENTIN 300 MG/1
1 CAPSULE ORAL
Refills: 0 | DISCHARGE

## 2024-11-25 RX ORDER — FAMOTIDINE 20 MG/1
1 TABLET, FILM COATED ORAL
Refills: 0 | DISCHARGE

## 2024-11-25 RX ORDER — LAMOTRIGINE 50 MG/1
100 TABLET, EXTENDED RELEASE ORAL
Refills: 0 | Status: DISCONTINUED | OUTPATIENT
Start: 2024-11-25 | End: 2024-12-06

## 2024-11-25 RX ORDER — AMLODIPINE BESYLATE 10 MG/1
1 TABLET ORAL
Refills: 0 | DISCHARGE

## 2024-11-25 RX ORDER — LAMOTRIGINE 50 MG/1
100 TABLET, EXTENDED RELEASE ORAL ONCE
Refills: 0 | Status: COMPLETED | OUTPATIENT
Start: 2024-11-25 | End: 2024-11-25

## 2024-11-25 RX ADMIN — LAMOTRIGINE 100 MILLIGRAM(S): 50 TABLET, EXTENDED RELEASE ORAL at 20:03

## 2024-11-25 RX ADMIN — AMLODIPINE BESYLATE 5 MILLIGRAM(S): 10 TABLET ORAL at 08:20

## 2024-11-25 RX ADMIN — GABAPENTIN 800 MILLIGRAM(S): 300 CAPSULE ORAL at 13:01

## 2024-11-25 RX ADMIN — Medication 20 MILLIGRAM(S): at 08:20

## 2024-11-25 RX ADMIN — GABAPENTIN 800 MILLIGRAM(S): 300 CAPSULE ORAL at 08:20

## 2024-11-25 RX ADMIN — LAMOTRIGINE 100 MILLIGRAM(S): 50 TABLET, EXTENDED RELEASE ORAL at 15:21

## 2024-11-25 RX ADMIN — CLONIDINE HYDROCHLORIDE 0.2 MILLIGRAM(S): 0.3 TABLET ORAL at 08:20

## 2024-11-25 RX ADMIN — GABAPENTIN 800 MILLIGRAM(S): 300 CAPSULE ORAL at 20:03

## 2024-11-25 RX ADMIN — CLONIDINE HYDROCHLORIDE 0.2 MILLIGRAM(S): 0.3 TABLET ORAL at 20:03

## 2024-11-25 RX ADMIN — RISPERIDONE 1 MILLIGRAM(S): 4 TABLET ORAL at 20:03

## 2024-11-25 NOTE — BH INPATIENT PSYCHIATRY PROGRESS NOTE - NSBHCHARTREVIEWVS_PSY_A_CORE FT
Vital Signs Last 24 Hrs  T(C): 37 (11-25-24 @ 07:52), Max: 37 (11-25-24 @ 07:52)  T(F): 98.6 (11-25-24 @ 07:52), Max: 98.6 (11-25-24 @ 07:52)  HR: 88 (11-25-24 @ 07:52) (82 - 109)  BP: 127/87 (11-25-24 @ 07:52) (119/85 - 144/108)  BP(mean): --  RR: --  SpO2: --     Vital Signs Last 24 Hrs  T(C): 37 (11-25-24 @ 07:52), Max: 37 (11-25-24 @ 07:52)  T(F): 98.6 (11-25-24 @ 07:52), Max: 98.6 (11-25-24 @ 07:52)  HR: 88 (11-25-24 @ 07:52) (82 - 98)  BP: 127/87 (11-25-24 @ 07:52) (119/85 - 130/92)  BP(mean): --  RR: --  SpO2: --

## 2024-11-25 NOTE — CONSULT NOTE ADULT - ASSESSMENT
34y undomiciled RHF PMH epilepsy Lumbar radiculopathy, DVT bilateral, depression/anxiety/PTSD/ADHD presented to ED after mother noted patient disorganized and having hallucinations. Patient with history of epilepsy and reports even with compliance to Lamictal 100mg bid she is still having seizure like episodes.     Recommendations  - vEEG  - Continue home dosage of Lamictal 100mg bid    Discussed with attending Dr Nowak   34y undomiciled RHF PMH epilepsy Lumbar radiculopathy, DVT bilateral, depression/anxiety/PTSD/ADHD presented to ED after mother noted patient disorganized and having hallucinations. Patient with history of epilepsy and reports even with compliance to Lamictal 100mg bid she is still having seizure like episodes.     Recommendations  - vEEG. If report negative can discontinue test  - Continue home dosage of Lamictal 100mg bid. If needed for psychiatric illness, can increase dosage instead of adding on another psychiatric agent   - F/u outpatient neurologist     Discussed with attending Dr Nowak   34y undomiciled RHF PMH epilepsy Lumbar radiculopathy, DVT bilateral, depression/anxiety/PTSD/ADHD presented to ED after mother noted patient disorganized and having hallucinations. Patient with history of epilepsy and reports even with compliance to Lamictal 100mg bid she is still having seizure like episodes. R/o seizure activity. Also c/o LBP, most likely MSK.    Recommendations  - vEEG. If report negative can discontinue test  - Continue home dosage of Lamictal 100mg bid. If needed for psychiatric illness, can increase dosage instead of adding on another psychiatric agent   - Flexeril 5mg tid prn LBP/muscle spasms  - F/u outpatient neurologist     Discussed with attending Dr Nowak
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
## 2024-11-25 NOTE — BH INPATIENT PSYCHIATRY PROGRESS NOTE - CURRENT MEDICATION
MEDICATIONS  (STANDING):  amLODIPine   Tablet 5 milliGRAM(s) Oral daily  cloNIDine 0.2 milliGRAM(s) Oral two times a day  DULoxetine 20 milliGRAM(s) Oral daily  gabapentin 800 milliGRAM(s) Oral three times a day  risperiDONE   Tablet 1 milliGRAM(s) Oral at bedtime    MEDICATIONS  (PRN):  acetaminophen     Tablet .. 650 milliGRAM(s) Oral every 6 hours PRN Temp greater or equal to 38C (100.4F), Mild Pain (1 - 3)  diphenhydrAMINE 50 milliGRAM(s) Oral every 6 hours PRN Combative behavior  haloperidol     Tablet 5 milliGRAM(s) Oral every 6 hours PRN agitation  LORazepam     Tablet 2 milliGRAM(s) Oral every 6 hours PRN Agitation   MEDICATIONS  (STANDING):  amLODIPine   Tablet 5 milliGRAM(s) Oral daily  cloNIDine 0.2 milliGRAM(s) Oral two times a day  DULoxetine 20 milliGRAM(s) Oral daily  gabapentin 800 milliGRAM(s) Oral three times a day  lamoTRIgine 100 milliGRAM(s) Oral two times a day  risperiDONE   Tablet 1 milliGRAM(s) Oral at bedtime    MEDICATIONS  (PRN):  acetaminophen     Tablet .. 650 milliGRAM(s) Oral every 6 hours PRN Temp greater or equal to 38C (100.4F), Mild Pain (1 - 3)  diphenhydrAMINE 50 milliGRAM(s) Oral every 6 hours PRN Combative behavior  haloperidol     Tablet 5 milliGRAM(s) Oral every 6 hours PRN agitation  LORazepam     Tablet 2 milliGRAM(s) Oral every 6 hours PRN Agitation   MEDICATIONS  (STANDING):  amLODIPine   Tablet 5 milliGRAM(s) Oral daily  cloNIDine 0.2 milliGRAM(s) Oral two times a day  DULoxetine 20 milliGRAM(s) Oral daily  gabapentin 800 milliGRAM(s) Oral three times a day  lamoTRIgine 100 milliGRAM(s) Oral two times a day  lamoTRIgine 100 milliGRAM(s) Oral once  risperiDONE   Tablet 1 milliGRAM(s) Oral at bedtime    MEDICATIONS  (PRN):  acetaminophen     Tablet .. 650 milliGRAM(s) Oral every 6 hours PRN Temp greater or equal to 38C (100.4F), Mild Pain (1 - 3)  diphenhydrAMINE 50 milliGRAM(s) Oral every 6 hours PRN Combative behavior  haloperidol     Tablet 5 milliGRAM(s) Oral every 6 hours PRN agitation  LORazepam     Tablet 2 milliGRAM(s) Oral every 6 hours PRN Agitation

## 2024-11-25 NOTE — BH INPATIENT PSYCHIATRY PROGRESS NOTE - OTHER
"the same" irritable and guarded re: why she's at the hospital unable to assess due to poverty of speech

## 2024-11-25 NOTE — UM REPORT PROGRESS NOTE - NSUMRMPROVIDER_GEN_A_CORE FT
[facility]  ID# [id number]  [phone]    11/25- Spoke with Annetta JIMÉNEZ from [facility]. Pending auth# [id number]. Faxed initial clinicals to [phone]. Awaiting for CM assignment and determination. [facility]  ID# [id number]  [phone]    11/25- Spoke with Annetta JIMÉNEZ from [facility]. Pending auth# [id number]. Faxed initial clinicals to [phone]. Awaiting for CM assignment and determination.    11/26- Received a vm ([phone]) from [place] approving 11 days from 11/24-12/4. Review on 12/4.  [facility]  ID# [id number]  [phone]    11/25- Spoke with Annetta JIMÉNEZ from [place]. Pending auth# [id number]. Faxed initial clinicals to [phone]. Awaiting for CM assignment and determination.    11/26- Received a vm ([phone]) from [place] approving 11 days from 11/24-12/4. Review on 12/4.     12/4 SW faxed clincals to [phone] awaiting determination for additional days [facility]  ID# [id number]  [phone]    11/25- Spoke with Annetta JIMÉNEZ from [place] [place]. Pending auth# [id number]. Faxed initial clinicals to [phone]. Awaiting for CM assignment and determination.    11/26- Received a vm ([phone]) from [place] [place] approving 11 days from 11/24-12/4. Review on 12/4.     12/4 SW faxed clincals to [phone] awaiting determination for additional days    12/6- Received a vm from [facility] ([phone]) approving additional 14 days. LCD 12/18. [facility]  ID# [id number]  [phone]    11/25- Spoke with Annetta JIMÉNEZ from [place]. Pending auth# [id number]. Faxed initial clinicals to [phone]. Awaiting for CM assignment and determination.    11/26- Received a vm ([phone]) from [place] approving 11 days from 11/24-12/4. Review on 12/4.     12/4 RYAN faxed clincals to [phone] awaiting determination for additional days    12/6- Received a vm from [facility] ([phone]) approving additional 14 days. LCD 12/18.    12/6 RYAN sent discharge clinical to close out the case.
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
## 2024-11-25 NOTE — BH INPATIENT PSYCHIATRY PROGRESS NOTE - NSBHMETABOLIC_PSY_ALL_CORE_FT
BMI: BMI (kg/m2): 32.7 (11-24-24 @ 01:11)  HbA1c: A1C with Estimated Average Glucose Result: 5.3 % (11-24-24 @ 07:26)    Glucose: POCT Blood Glucose.: 82 mg/dL (11-23-24 @ 16:18)    BP: 127/87 (11-25-24 @ 07:52) (119/85 - 151/93)Vital Signs Last 24 Hrs  T(C): 37 (11-25-24 @ 07:52), Max: 37 (11-25-24 @ 07:52)  T(F): 98.6 (11-25-24 @ 07:52), Max: 98.6 (11-25-24 @ 07:52)  HR: 88 (11-25-24 @ 07:52) (82 - 109)  BP: 127/87 (11-25-24 @ 07:52) (119/85 - 144/108)  BP(mean): --  RR: --  SpO2: --      Lipid Panel: Date/Time: 11-24-24 @ 07:26  Cholesterol, Serum: 184  LDL Cholesterol Calculated: 123  HDL Cholesterol, Serum: 51  Total Cholesterol/HDL Ration Measurement: --  Triglycerides, Serum: 50   BMI: BMI (kg/m2): 32.7 (11-24-24 @ 01:11)  HbA1c: A1C with Estimated Average Glucose Result: 5.3 % (11-24-24 @ 07:26)    Glucose: POCT Blood Glucose.: 82 mg/dL (11-23-24 @ 16:18)    BP: 127/87 (11-25-24 @ 07:52) (119/85 - 151/93)Vital Signs Last 24 Hrs  T(C): 37 (11-25-24 @ 07:52), Max: 37 (11-25-24 @ 07:52)  T(F): 98.6 (11-25-24 @ 07:52), Max: 98.6 (11-25-24 @ 07:52)  HR: 88 (11-25-24 @ 07:52) (82 - 98)  BP: 127/87 (11-25-24 @ 07:52) (119/85 - 130/92)  BP(mean): --  RR: --  SpO2: --      Lipid Panel: Date/Time: 11-24-24 @ 07:26  Cholesterol, Serum: 184  LDL Cholesterol Calculated: 123  HDL Cholesterol, Serum: 51  Total Cholesterol/HDL Ration Measurement: --  Triglycerides, Serum: 50

## 2024-11-26 LAB
ALBUMIN SERPL ELPH-MCNC: 4.1 G/DL — SIGNIFICANT CHANGE UP (ref 3.5–5.2)
ALP SERPL-CCNC: 70 U/L — SIGNIFICANT CHANGE UP (ref 30–115)
ALT FLD-CCNC: 17 U/L — SIGNIFICANT CHANGE UP (ref 0–41)
AST SERPL-CCNC: 15 U/L — SIGNIFICANT CHANGE UP (ref 0–41)
BILIRUB DIRECT SERPL-MCNC: <0.2 MG/DL — SIGNIFICANT CHANGE UP (ref 0–0.3)
BILIRUB INDIRECT FLD-MCNC: 0 MG/DL — SIGNIFICANT CHANGE UP (ref 0.2–1.2)
BILIRUB SERPL-MCNC: <0.2 MG/DL — SIGNIFICANT CHANGE UP (ref 0.2–1.2)
PROT SERPL-MCNC: 7.4 G/DL — SIGNIFICANT CHANGE UP (ref 6–8)
TSH SERPL-MCNC: 0.18 UIU/ML — LOW (ref 0.27–4.2)

## 2024-11-26 PROCEDURE — 99232 SBSQ HOSP IP/OBS MODERATE 35: CPT

## 2024-11-26 PROCEDURE — 99222 1ST HOSP IP/OBS MODERATE 55: CPT

## 2024-11-26 PROCEDURE — 99231 SBSQ HOSP IP/OBS SF/LOW 25: CPT

## 2024-11-26 RX ORDER — IBUPROFEN 200 MG
600 TABLET ORAL EVERY 12 HOURS
Refills: 0 | Status: DISCONTINUED | OUTPATIENT
Start: 2024-11-27 | End: 2024-12-06

## 2024-11-26 RX ORDER — IBUPROFEN 200 MG
600 TABLET ORAL EVERY 8 HOURS
Refills: 0 | Status: DISCONTINUED | OUTPATIENT
Start: 2024-11-26 | End: 2024-11-26

## 2024-11-26 RX ORDER — IBUPROFEN 200 MG
600 TABLET ORAL ONCE
Refills: 0 | Status: COMPLETED | OUTPATIENT
Start: 2024-11-26 | End: 2024-11-26

## 2024-11-26 RX ORDER — CYCLOBENZAPRINE HCL 10 MG
5 TABLET ORAL THREE TIMES A DAY
Refills: 0 | Status: DISCONTINUED | OUTPATIENT
Start: 2024-11-26 | End: 2024-12-06

## 2024-11-26 RX ADMIN — GABAPENTIN 800 MILLIGRAM(S): 300 CAPSULE ORAL at 20:29

## 2024-11-26 RX ADMIN — LAMOTRIGINE 100 MILLIGRAM(S): 50 TABLET, EXTENDED RELEASE ORAL at 20:29

## 2024-11-26 RX ADMIN — LAMOTRIGINE 100 MILLIGRAM(S): 50 TABLET, EXTENDED RELEASE ORAL at 08:19

## 2024-11-26 RX ADMIN — ACETAMINOPHEN 500MG 650 MILLIGRAM(S): 500 TABLET, COATED ORAL at 15:10

## 2024-11-26 RX ADMIN — ACETAMINOPHEN 500MG 650 MILLIGRAM(S): 500 TABLET, COATED ORAL at 15:47

## 2024-11-26 RX ADMIN — GABAPENTIN 800 MILLIGRAM(S): 300 CAPSULE ORAL at 12:52

## 2024-11-26 RX ADMIN — CLONIDINE HYDROCHLORIDE 0.2 MILLIGRAM(S): 0.3 TABLET ORAL at 08:19

## 2024-11-26 RX ADMIN — Medication 5 MILLIGRAM(S): at 20:29

## 2024-11-26 RX ADMIN — Medication 600 MILLIGRAM(S): at 21:20

## 2024-11-26 RX ADMIN — GABAPENTIN 800 MILLIGRAM(S): 300 CAPSULE ORAL at 08:19

## 2024-11-26 RX ADMIN — Medication 600 MILLIGRAM(S): at 20:29

## 2024-11-26 RX ADMIN — AMLODIPINE BESYLATE 5 MILLIGRAM(S): 10 TABLET ORAL at 08:19

## 2024-11-26 RX ADMIN — RISPERIDONE 1 MILLIGRAM(S): 4 TABLET ORAL at 20:29

## 2024-11-26 RX ADMIN — Medication 20 MILLIGRAM(S): at 08:19

## 2024-11-26 RX ADMIN — CLONIDINE HYDROCHLORIDE 0.2 MILLIGRAM(S): 0.3 TABLET ORAL at 20:29

## 2024-11-26 NOTE — BH SAFETY PLAN - INTERNAL COPING STRATEGY 2
Another way I would cope with my issues would be to listen to music, my go to music would be like old school RnB.

## 2024-11-26 NOTE — BH SAFETY PLAN - WARNING SIGN 2
Another warning sign that a crisis is going to develop is that It hard for me to rest, calm down, like I cannot sleep.

## 2024-11-26 NOTE — BH INPATIENT PSYCHIATRY PROGRESS NOTE - CURRENT MEDICATION
MEDICATIONS  (STANDING):  amLODIPine   Tablet 5 milliGRAM(s) Oral daily  cloNIDine 0.2 milliGRAM(s) Oral two times a day  DULoxetine 20 milliGRAM(s) Oral daily  gabapentin 800 milliGRAM(s) Oral three times a day  lamoTRIgine 100 milliGRAM(s) Oral two times a day  risperiDONE   Tablet 1 milliGRAM(s) Oral at bedtime    MEDICATIONS  (PRN):  acetaminophen     Tablet .. 650 milliGRAM(s) Oral every 6 hours PRN Temp greater or equal to 38C (100.4F), Mild Pain (1 - 3)  diphenhydrAMINE 50 milliGRAM(s) Oral every 6 hours PRN Combative behavior  haloperidol     Tablet 5 milliGRAM(s) Oral every 6 hours PRN agitation  LORazepam     Tablet 2 milliGRAM(s) Oral every 6 hours PRN Agitation

## 2024-11-26 NOTE — BH SAFETY PLAN - ENVIRONMENT SAFETY 3:
Also, another way I would make my environment safe would be to try and use my coping skills to help me.
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
## 2024-11-26 NOTE — BH SAFETY PLAN - ENVIRONMENT SAFETY 2:
Another way I would make my environment safe would be to stay away from certain people, places an things.
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
## 2024-11-26 NOTE — PSYCHIATRIC REHAB INITIAL EVALUATION - NSBHPRTOOLBOX_PSY_ALL_CORE
Pt reportedly sees an outpatient psychiatrist x1 month for medication management.  Pt maintains contact with her mother and stays at her house from time to time/Family support/Treatment team provider support

## 2024-11-26 NOTE — BH INPATIENT PSYCHIATRY PROGRESS NOTE - NSBHMETABOLIC_PSY_ALL_CORE_FT
BMI: BMI (kg/m2): 32.7 (11-24-24 @ 01:11)  HbA1c: A1C with Estimated Average Glucose Result: 5.3 % (11-24-24 @ 07:26)    Glucose: POCT Blood Glucose.: 82 mg/dL (11-23-24 @ 16:18)    BP: 122/85 (11-26-24 @ 07:52) (118/79 - 151/93)Vital Signs Last 24 Hrs  T(C): 37.1 (11-26-24 @ 07:52), Max: 37.1 (11-26-24 @ 07:52)  T(F): 98.7 (11-26-24 @ 07:52), Max: 98.7 (11-26-24 @ 07:52)  HR: 75 (11-26-24 @ 07:52) (75 - 93)  BP: 122/85 (11-26-24 @ 07:52) (118/79 - 122/85)  BP(mean): --  RR: --  SpO2: --      Lipid Panel: Date/Time: 11-24-24 @ 07:26  Cholesterol, Serum: 184  LDL Cholesterol Calculated: 123  HDL Cholesterol, Serum: 51  Total Cholesterol/HDL Ration Measurement: --  Triglycerides, Serum: 50   BMI: BMI (kg/m2): 32.7 (11-24-24 @ 01:11)  HbA1c: A1C with Estimated Average Glucose Result: 5.3 % (11-24-24 @ 07:26)    Glucose: POCT Blood Glucose.: 82 mg/dL (11-23-24 @ 16:18)    BP: 119/87 (11-26-24 @ 16:14) (118/79 - 151/93)Vital Signs Last 24 Hrs  T(C): 36.7 (11-26-24 @ 16:14), Max: 37.1 (11-26-24 @ 07:52)  T(F): 98.1 (11-26-24 @ 16:14), Max: 98.7 (11-26-24 @ 07:52)  HR: 86 (11-26-24 @ 16:14) (75 - 86)  BP: 119/87 (11-26-24 @ 16:14) (119/87 - 122/85)  BP(mean): --  RR: 14 (11-26-24 @ 16:14) (14 - 14)  SpO2: --      Lipid Panel: Date/Time: 11-24-24 @ 07:26  Cholesterol, Serum: 184  LDL Cholesterol Calculated: 123  HDL Cholesterol, Serum: 51  Total Cholesterol/HDL Ration Measurement: --  Triglycerides, Serum: 50

## 2024-11-26 NOTE — BH SAFETY PLAN - DISTRACTION PLACE 1
One palce that is safe and will provide a distraction for me would be [facility]ino's house because it quiet there.

## 2024-11-26 NOTE — BH TREATMENT PLAN - NSTXPATIENTPARTICIPATE_PSY_ALL_CORE
Patient participated in defining interventions/Patient participated in development of after care plan Patient participated in identification of needs/problems/goals for treatment/Patient participated in defining interventions/Patient participated in development of after care plan

## 2024-11-26 NOTE — BH INPATIENT PSYCHIATRY PROGRESS NOTE - NSICDXBHTERTIARYDX_PSY_ALL_CORE
R/O Major depressive disorder with psychotic features   F32.3   R/O Major depressive disorder with psychotic features   F32.3  R/O Schizophrenia   F20.9  R/O Schizoaffective disorder   F25.9

## 2024-11-26 NOTE — BH INPATIENT PSYCHIATRY PROGRESS NOTE - NSBHCHARTREVIEWVS_PSY_A_CORE FT
Vital Signs Last 24 Hrs  T(C): 37.1 (11-26-24 @ 07:52), Max: 37.1 (11-26-24 @ 07:52)  T(F): 98.7 (11-26-24 @ 07:52), Max: 98.7 (11-26-24 @ 07:52)  HR: 75 (11-26-24 @ 07:52) (75 - 93)  BP: 122/85 (11-26-24 @ 07:52) (118/79 - 122/85)  BP(mean): --  RR: --  SpO2: --     Vital Signs Last 24 Hrs  T(C): 36.7 (11-26-24 @ 16:14), Max: 37.1 (11-26-24 @ 07:52)  T(F): 98.1 (11-26-24 @ 16:14), Max: 98.7 (11-26-24 @ 07:52)  HR: 86 (11-26-24 @ 16:14) (75 - 86)  BP: 119/87 (11-26-24 @ 16:14) (119/87 - 122/85)  BP(mean): --  RR: 14 (11-26-24 @ 16:14) (14 - 14)  SpO2: --

## 2024-11-26 NOTE — BH SAFETY PLAN - DISTRACTION PLACE 2
Another place that is safe and will provide a distraction for me would be the park or the [place], anywhere there is a body of water.

## 2024-11-26 NOTE — CONSULT NOTE ADULT - ASSESSMENT
Patient is a 34y old  Female who presents with a chief complaint of Depression     Medicine consulted for New IPP admission  Patient denies any medical history   Vitals and labs are grossly WNL   Medicine will sign off    Suicidal ideation management per Psych      Patient is a 34y old  Female who presents with a chief complaint of Depression     Medicine consulted for New IPP admission  Patient Endorse medical History of Hypertension and pre-DM   BP is well controlled, continue with antihypertensive   Recent A1C 5.3% does not favor Pre-DM  Continue O/P f/up with PCP to monitor A1C's   Vitals and labs are grossly WNL     Medicine will sign off    Suicidal ideation management per Psych

## 2024-11-26 NOTE — CONSULT NOTE ADULT - SUBJECTIVE AND OBJECTIVE BOX
NEUROLOGY CONSULT    HPI: 34y undomiciled RHF PMH epilepsy Lumbar radiculopathy, DVT bilateral, depression/anxiety/PTSD/ADHD presented to ED after mother noted patient disorganized and having hallucinations. Patient is not sure why she is here and wants to go home. Neuro consulted due to history of seizures. Per patient, she has had seizure history for 12 years, since pregnancy with daughter. Seizures typically consist of staring spells/ "zoning out" for a few seconds at a time. She previously was managed on Keppra. But in March/April of this year she had two grand mal seizures where she notes witnesses report she was shaking UEs/LEs and lost bladder function. At that time, she had EEG, and Keppra was switched to Lamictal. She has been compliant with Lamictal 100mg bid at home, but notes that she feels this is not controlling seizures adequately. She reports having had four seizure like episodes - "staring off"- over the last 2 weeks. She denies side effects with Lamictal or even previously with Keppra.      She is in the middle of switching neurologists, and can not remembers names. Providers are based out of [place].      MEDICATIONS  Home Medications:  cloNIDine 0.2 mg oral tablet: 1 tab(s) orally 2 times a day (25 Nov 2024 12:16)  Cymbalta 20 mg oral delayed release capsule: 1 cap(s) orally once a day (25 Nov 2024 12:16)  famotidine 40 mg oral tablet: 1 tab(s) orally once a day (25 Nov 2024 12:14)  gabapentin 800 mg oral tablet: 1 tab(s) orally 3 times a day (25 Nov 2024 12:16)  Norvasc 5 mg oral tablet: 1 tab(s) orally once a day (25 Nov 2024 12:16)    MEDICATIONS  (STANDING):  amLODIPine   Tablet 5 milliGRAM(s) Oral daily  cloNIDine 0.2 milliGRAM(s) Oral two times a day  DULoxetine 20 milliGRAM(s) Oral daily  gabapentin 800 milliGRAM(s) Oral three times a day  risperiDONE   Tablet 1 milliGRAM(s) Oral at bedtime    MEDICATIONS  (PRN):  acetaminophen     Tablet .. 650 milliGRAM(s) Oral every 6 hours PRN Temp greater or equal to 38C (100.4F), Mild Pain (1 - 3)  diphenhydrAMINE 50 milliGRAM(s) Oral every 6 hours PRN Combative behavior  haloperidol     Tablet 5 milliGRAM(s) Oral every 6 hours PRN agitation  LORazepam     Tablet 2 milliGRAM(s) Oral every 6 hours PRN Agitation      SOCIAL HISTORY: negative for tobacco, alcohol, or illicit drug use.    Allergies  penicillin (Fever; Vomiting; Rash)  amoxicillin (Fever; Rash)      GEN: NAD, pleasant, cooperative    NEURO:   MENTAL STATUS: AAOx3  LANG/SPEECH: Fluent, intact naming, repetition & comprehension  CRANIAL NERVES:  II: Pupils equal round and reactive, normal visual fields  III, IV, VI: EOM intact, no gaze preference or deviation  V: normal  VII: no facial asymmetry  VIII: normal hearing to speech  MOTOR: 5/5 in both upper and lower extremities  REFLEXES: 2/4 throughout  SENSORY: Normal to light touch  COORD: Normal finger to nose and heel to shin, no tremor, no dysmetria      CAPILLARY BLOOD GLUCOSE  POCT Blood Glucose.: 82 mg/dL (23 Nov 2024 16:18)      
Patient is a 34y old  Female who presents with a chief complaint of Depression       MEDICATIONS  (STANDING):  amLODIPine   Tablet 5 milliGRAM(s) Oral daily  cloNIDine 0.2 milliGRAM(s) Oral two times a day  DULoxetine 20 milliGRAM(s) Oral daily  gabapentin 800 milliGRAM(s) Oral three times a day  lamoTRIgine 100 milliGRAM(s) Oral two times a day  risperiDONE   Tablet 1 milliGRAM(s) Oral at bedtime    MEDICATIONS  (PRN):  acetaminophen     Tablet .. 650 milliGRAM(s) Oral every 6 hours PRN Temp greater or equal to 38C (100.4F), Mild Pain (1 - 3)  diphenhydrAMINE 50 milliGRAM(s) Oral every 6 hours PRN Combative behavior  haloperidol     Tablet 5 milliGRAM(s) Oral every 6 hours PRN agitation  LORazepam     Tablet 2 milliGRAM(s) Oral every 6 hours PRN Agitation      CAPILLARY BLOOD GLUCOSE  I&O's Smmary      PHYSICAL EXAM:  Vital Signs Last 24 Hrs  T(C): 36.7 (26 Nov 2024 16:14), Max: 37.1 (26 Nov 2024 07:52)  T(F): 98.1 (26 Nov 2024 16:14), Max: 98.7 (26 Nov 2024 07:52)  HR: 86 (26 Nov 2024 16:14) (75 - 86)  BP: 119/87 (26 Nov 2024 16:14) (119/87 - 122/85)  BP(mean): --  RR: 14 (26 Nov 2024 16:14) (14 - 14)  SpO2: --      GENERAL: No acute distress, well-developed  CHEST/LUNG: CTAB; No wheezes, rales, or rhonchi  HEART: Regular rate and rhythm; No murmurs, rubs, or gallops  ABDOMEN: Soft, non-tender, non-distended; normal bowel sounds,  EXTREMITIES:  No clubbing, cyanosis, or edema  NEUROLOGY: A&O x 3, no focal deficits        TPro  7.4  /  Alb  4.1  /  TBili  <0.2  /  DBili  <0.2  /  AST  15  /  ALT  17  /  AlkPhos  70  11-26

## 2024-11-26 NOTE — PSYCHIATRIC REHAB INITIAL EVALUATION - LIVES WITH
Pt is currently undomiciled, living in shelter system prior to IPP admission, sometimes staying at her mother's house/alone

## 2024-11-27 LAB
FOLATE SERPL-MCNC: 10.8 NG/ML — SIGNIFICANT CHANGE UP
T PALLIDUM AB TITR SER: NEGATIVE — SIGNIFICANT CHANGE UP
VIT B12 SERPL-MCNC: 641 PG/ML — SIGNIFICANT CHANGE UP (ref 232–1245)

## 2024-11-27 PROCEDURE — 99232 SBSQ HOSP IP/OBS MODERATE 35: CPT

## 2024-11-27 PROCEDURE — 95718 EEG PHYS/QHP 2-12 HR W/VEEG: CPT

## 2024-11-27 RX ORDER — RISPERIDONE 4 MG/1
2 TABLET ORAL AT BEDTIME
Refills: 0 | Status: DISCONTINUED | OUTPATIENT
Start: 2024-11-27 | End: 2024-11-29

## 2024-11-27 RX ADMIN — GABAPENTIN 800 MILLIGRAM(S): 300 CAPSULE ORAL at 20:10

## 2024-11-27 RX ADMIN — Medication 600 MILLIGRAM(S): at 16:37

## 2024-11-27 RX ADMIN — Medication 20 MILLIGRAM(S): at 08:19

## 2024-11-27 RX ADMIN — GABAPENTIN 800 MILLIGRAM(S): 300 CAPSULE ORAL at 08:18

## 2024-11-27 RX ADMIN — LAMOTRIGINE 100 MILLIGRAM(S): 50 TABLET, EXTENDED RELEASE ORAL at 20:10

## 2024-11-27 RX ADMIN — AMLODIPINE BESYLATE 5 MILLIGRAM(S): 10 TABLET ORAL at 08:19

## 2024-11-27 RX ADMIN — LAMOTRIGINE 100 MILLIGRAM(S): 50 TABLET, EXTENDED RELEASE ORAL at 08:18

## 2024-11-27 RX ADMIN — CLONIDINE HYDROCHLORIDE 0.2 MILLIGRAM(S): 0.3 TABLET ORAL at 20:10

## 2024-11-27 RX ADMIN — RISPERIDONE 2 MILLIGRAM(S): 4 TABLET ORAL at 20:10

## 2024-11-27 RX ADMIN — CLONIDINE HYDROCHLORIDE 0.2 MILLIGRAM(S): 0.3 TABLET ORAL at 08:19

## 2024-11-27 RX ADMIN — Medication 600 MILLIGRAM(S): at 17:53

## 2024-11-27 RX ADMIN — GABAPENTIN 800 MILLIGRAM(S): 300 CAPSULE ORAL at 12:30

## 2024-11-27 NOTE — BH INPATIENT PSYCHIATRY PROGRESS NOTE - NSBHCHARTREVIEWVS_PSY_A_CORE FT
Vital Signs Last 24 Hrs  T(C): 36.7 (11-27-24 @ 10:58), Max: 36.7 (11-26-24 @ 16:14)  T(F): 98 (11-27-24 @ 10:58), Max: 98.1 (11-26-24 @ 16:14)  HR: 83 (11-27-24 @ 10:58) (83 - 86)  BP: 112/80 (11-27-24 @ 10:58) (112/80 - 119/87)  BP(mean): --  RR: 14 (11-26-24 @ 16:14) (14 - 14)  SpO2: --     Vital Signs Last 24 Hrs  T(C): 36.4 (11-27-24 @ 15:40), Max: 36.7 (11-27-24 @ 08:29)  T(F): 97.6 (11-27-24 @ 15:40), Max: 98 (11-27-24 @ 08:29)  HR: 94 (11-27-24 @ 15:40) (83 - 94)  BP: 123/83 (11-27-24 @ 15:40) (112/80 - 123/83)  BP(mean): --  RR: --  SpO2: --
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
## 2024-11-27 NOTE — EEG REPORT - NS EEG TEXT BOX
Epilepsy Attending Note:     IQRA MILLER    34y Female  MRN [id number]    Vital Signs Last 24 Hrs  T(C): 36.7 (27 Nov 2024 08:29), Max: 36.7 (26 Nov 2024 16:14)  T(F): 98 (27 Nov 2024 08:29), Max: 98.1 (26 Nov 2024 16:14)  HR: 83 (27 Nov 2024 08:29) (83 - 86)  BP: 112/80 (27 Nov 2024 08:29) (112/80 - 119/87)  BP(mean): --  RR: 14 (26 Nov 2024 16:14) (14 - 14)  SpO2: --                TPro  7.4  /  Alb  4.1  /  TBili  <0.2  /  DBili  <0.2  /  AST  15  /  ALT  17  /  AlkPhos  70  11-26      MEDICATIONS  (STANDING):  amLODIPine   Tablet 5 milliGRAM(s) Oral daily  cloNIDine 0.2 milliGRAM(s) Oral two times a day  DULoxetine 20 milliGRAM(s) Oral daily  gabapentin 800 milliGRAM(s) Oral three times a day  lamoTRIgine 100 milliGRAM(s) Oral two times a day  risperiDONE   Tablet 1 milliGRAM(s) Oral at bedtime    MEDICATIONS  (PRN):  cyclobenzaprine 5 milliGRAM(s) Oral three times a day PRN Muscle Spasm  diphenhydrAMINE 50 milliGRAM(s) Oral every 6 hours PRN Combative behavior  haloperidol     Tablet 5 milliGRAM(s) Oral every 6 hours PRN agitation  ibuprofen  Tablet. 600 milliGRAM(s) Oral every 12 hours PRN Mild Pain (1 - 3), Moderate Pain (4 - 6)  LORazepam     Tablet 2 milliGRAM(s) Oral every 6 hours PRN Agitation            VEEG in the last 9 hours:    Background - continuous, symmetrical, well organized, reaching frequencies in the range of 8-9 Hz, showing good reactivity, slightly less than optimal modulation. Shows normal stage 1 and 2 sleep.    Focal and generalized slowing - none    Interictal activity - none    Events - patient removed EEG alectorides at 6:30 pm.    Seizures - none    Impression: Normal VEEG awake and sleep x 9 hrs.    Plan - per neurology team
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
## 2024-11-27 NOTE — BH INPATIENT PSYCHIATRY PROGRESS NOTE - NSBHMETABOLIC_PSY_ALL_CORE_FT
BMI: BMI (kg/m2): 32.7 (11-24-24 @ 01:11)  HbA1c: A1C with Estimated Average Glucose Result: 5.3 % (11-24-24 @ 07:26)    Glucose: POCT Blood Glucose.: 82 mg/dL (11-23-24 @ 16:18)    BP: 112/80 (11-27-24 @ 10:58) (112/80 - 144/108)Vital Signs Last 24 Hrs  T(C): 36.7 (11-27-24 @ 10:58), Max: 36.7 (11-26-24 @ 16:14)  T(F): 98 (11-27-24 @ 10:58), Max: 98.1 (11-26-24 @ 16:14)  HR: 83 (11-27-24 @ 10:58) (83 - 86)  BP: 112/80 (11-27-24 @ 10:58) (112/80 - 119/87)  BP(mean): --  RR: 14 (11-26-24 @ 16:14) (14 - 14)  SpO2: --      Lipid Panel: Date/Time: 11-24-24 @ 07:26  Cholesterol, Serum: 184  LDL Cholesterol Calculated: 123  HDL Cholesterol, Serum: 51  Total Cholesterol/HDL Ration Measurement: --  Triglycerides, Serum: 50   BMI: BMI (kg/m2): 32.7 (11-24-24 @ 01:11)  HbA1c: A1C with Estimated Average Glucose Result: 5.3 % (11-24-24 @ 07:26)    Glucose: POCT Blood Glucose.: 82 mg/dL (11-23-24 @ 16:18)    BP: 123/83 (11-27-24 @ 15:40) (112/80 - 127/87)Vital Signs Last 24 Hrs  T(C): 36.4 (11-27-24 @ 15:40), Max: 36.7 (11-27-24 @ 08:29)  T(F): 97.6 (11-27-24 @ 15:40), Max: 98 (11-27-24 @ 08:29)  HR: 94 (11-27-24 @ 15:40) (83 - 94)  BP: 123/83 (11-27-24 @ 15:40) (112/80 - 123/83)  BP(mean): --  RR: --  SpO2: --      Lipid Panel: Date/Time: 11-24-24 @ 07:26  Cholesterol, Serum: 184  LDL Cholesterol Calculated: 123  HDL Cholesterol, Serum: 51  Total Cholesterol/HDL Ration Measurement: --  Triglycerides, Serum: 50

## 2024-11-27 NOTE — BH INPATIENT PSYCHIATRY PROGRESS NOTE - CURRENT MEDICATION
MEDICATIONS  (STANDING):  amLODIPine   Tablet 5 milliGRAM(s) Oral daily  cloNIDine 0.2 milliGRAM(s) Oral two times a day  DULoxetine 20 milliGRAM(s) Oral daily  gabapentin 800 milliGRAM(s) Oral three times a day  lamoTRIgine 100 milliGRAM(s) Oral two times a day  risperiDONE   Tablet 2 milliGRAM(s) Oral at bedtime    MEDICATIONS  (PRN):  cyclobenzaprine 5 milliGRAM(s) Oral three times a day PRN Muscle Spasm  diphenhydrAMINE 50 milliGRAM(s) Oral every 6 hours PRN Combative behavior  haloperidol     Tablet 5 milliGRAM(s) Oral every 6 hours PRN agitation  ibuprofen  Tablet. 600 milliGRAM(s) Oral every 12 hours PRN Mild Pain (1 - 3), Moderate Pain (4 - 6)  LORazepam     Tablet 2 milliGRAM(s) Oral every 6 hours PRN Agitation

## 2024-11-27 NOTE — BH INPATIENT PSYCHIATRY PROGRESS NOTE - NSICDXBHTERTIARYDX_PSY_ALL_CORE
R/O Major depressive disorder with psychotic features   F32.3  R/O Schizophrenia   F20.9  R/O Schizoaffective disorder   F25.9

## 2024-11-28 LAB
T4 FREE SERPL-MCNC: 1 NG/DL — SIGNIFICANT CHANGE UP (ref 0.9–1.8)
TSH SERPL-MCNC: 0.35 UIU/ML — SIGNIFICANT CHANGE UP (ref 0.27–4.2)

## 2024-11-28 RX ADMIN — AMLODIPINE BESYLATE 5 MILLIGRAM(S): 10 TABLET ORAL at 08:13

## 2024-11-28 RX ADMIN — Medication 20 MILLIGRAM(S): at 08:09

## 2024-11-28 RX ADMIN — Medication 600 MILLIGRAM(S): at 08:17

## 2024-11-28 RX ADMIN — LAMOTRIGINE 100 MILLIGRAM(S): 50 TABLET, EXTENDED RELEASE ORAL at 08:09

## 2024-11-28 RX ADMIN — Medication 600 MILLIGRAM(S): at 08:45

## 2024-11-28 RX ADMIN — LAMOTRIGINE 100 MILLIGRAM(S): 50 TABLET, EXTENDED RELEASE ORAL at 20:21

## 2024-11-28 RX ADMIN — Medication 5 MILLIGRAM(S): at 12:26

## 2024-11-28 RX ADMIN — CLONIDINE HYDROCHLORIDE 0.2 MILLIGRAM(S): 0.3 TABLET ORAL at 20:22

## 2024-11-28 RX ADMIN — RISPERIDONE 2 MILLIGRAM(S): 4 TABLET ORAL at 20:21

## 2024-11-28 RX ADMIN — GABAPENTIN 800 MILLIGRAM(S): 300 CAPSULE ORAL at 08:13

## 2024-11-28 RX ADMIN — CLONIDINE HYDROCHLORIDE 0.2 MILLIGRAM(S): 0.3 TABLET ORAL at 08:08

## 2024-11-28 RX ADMIN — GABAPENTIN 800 MILLIGRAM(S): 300 CAPSULE ORAL at 12:27

## 2024-11-28 RX ADMIN — GABAPENTIN 800 MILLIGRAM(S): 300 CAPSULE ORAL at 20:21
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
## 2024-11-28 NOTE — BH INPATIENT PSYCHIATRY PROGRESS NOTE - OTHER
Discharge Note    Progress reporting period is from initial evaluation date (please see noted date below) to Feb 23, 2022.  Linked Episodes   Type: Episode: Status: Noted: Resolved: Last update: Updated by:   PHYSICAL THERAPY Neck Pain 1/24/2022 Active 1/24/2022 2/23/2022  1:50 PM Nikki Aguirre, PT      Comments:       Meggan failed to follow up and current status is unknown.  Please see information below for last relevant information on current status.  Patient seen for 4 visits.    SUBJECTIVE  Subjective changes noted by patient:  Feeling better and feeling a little looser. She did some shoveling and now she is feeling a little tighter than normal. Feeling around 85-90% recovered.   .  Current pain level is 0/10.     Previous pain level was  2/10.   Changes in function:  Yes (See Goal flowsheet attached for changes in current functional level)  Adverse reaction to treatment or activity: None    OBJECTIVE  Changes noted in objective findings: Painful/tight to palpation B UT and Scalenes     ASSESSMENT/PLAN  Diagnosis: Neck pain   Updated problem list and treatment plan:   Pain - HEP  STG/LTGs have been met or progress has been made towards goals:  Yes, please see goal flowsheet for most current information  Assessment of Progress: current status is unknown.    Last current status: Pt is progressing as expected   Self Management Plans:  HEP  I have re-evaluated this patient and find that the nature, scope, duration and intensity of the therapy is appropriate for the medical condition of the patient.  Meggan continues to require the following intervention to meet STG and LTG's:  HEP.    Recommendations:  Discharge with current home program.  Patient to follow up with MD as needed.    Please refer to the daily flowsheet for treatment today, total treatment time and time spent performing 1:1 timed codes.     florencioa  Patient describes females "snickering off" in the hallway last night. This may be an AH, however it is also plausible that it was reality.

## 2024-11-28 NOTE — BH INPATIENT PSYCHIATRY PROGRESS NOTE - NSBHMETABOLIC_PSY_ALL_CORE_FT
BMI: BMI (kg/m2): 32.7 (11-24-24 @ 01:11)  HbA1c: A1C with Estimated Average Glucose Result: 5.3 % (11-24-24 @ 07:26)    Glucose: POCT Blood Glucose.: 82 mg/dL (11-23-24 @ 16:18)    BP: 124/88 (11-28-24 @ 16:03) (112/80 - 124/88)Vital Signs Last 24 Hrs  T(C): 36.8 (11-28-24 @ 16:03), Max: 36.9 (11-28-24 @ 07:46)  T(F): 98.2 (11-28-24 @ 16:03), Max: 98.5 (11-28-24 @ 07:46)  HR: 111 (11-28-24 @ 16:03) (93 - 111)  BP: 124/88 (11-28-24 @ 16:03) (121/86 - 124/88)  BP(mean): --  RR: 20 (11-28-24 @ 16:03) (20 - 20)  SpO2: --      Lipid Panel: Date/Time: 11-24-24 @ 07:26  Cholesterol, Serum: 184  LDL Cholesterol Calculated: 123  HDL Cholesterol, Serum: 51  Total Cholesterol/HDL Ration Measurement: --  Triglycerides, Serum: 50

## 2024-11-28 NOTE — BH INPATIENT PSYCHIATRY PROGRESS NOTE - NSBHMSETHTPROC_PSY_A_CORE
Agustina Jasmine presents to the urgent care with concern for   \"head ache I can not kick, I've been using migraine meds for 2.5 weeks.\"  Teeth hurting. R>L. Pain above Right eye  \"weird things on back of throat for last few days\"  No cough  No n/v/d  + body aches. 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  Can leave a detailed message for Agustina Jasmine at [phone] for urgent care visit on 4/18/2020.  N95 Mask, goggles,gown and gloves was worn by myself in this patients care.  Patient was also masked.           Linear/Impaired reasoning

## 2024-11-28 NOTE — BH INPATIENT PSYCHIATRY PROGRESS NOTE - NSBHCHARTREVIEWVS_PSY_A_CORE FT
Vital Signs Last 24 Hrs  T(C): 36.8 (11-28-24 @ 16:03), Max: 36.9 (11-28-24 @ 07:46)  T(F): 98.2 (11-28-24 @ 16:03), Max: 98.5 (11-28-24 @ 07:46)  HR: 111 (11-28-24 @ 16:03) (93 - 111)  BP: 124/88 (11-28-24 @ 16:03) (121/86 - 124/88)  BP(mean): --  RR: 20 (11-28-24 @ 16:03) (20 - 20)  SpO2: --

## 2024-11-28 NOTE — BH INPATIENT PSYCHIATRY PROGRESS NOTE - NSBHATTESTSTAFFAMEND_PSY_A_CORE
77 I have personally seen and examined this patient. I fully participated in the care of this patient. I have made amendments to the documentation where appropriate and otherwise agree with the history, physical exam, and plan as documented by the
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
## 2024-11-29 LAB — T3FREE SERPL-MCNC: 2.16 PG/ML — SIGNIFICANT CHANGE UP (ref 2–4.4)

## 2024-11-29 PROCEDURE — 99232 SBSQ HOSP IP/OBS MODERATE 35: CPT

## 2024-11-29 RX ORDER — LIDOCAINE 40 MG/G
1 CREAM TOPICAL DAILY
Refills: 0 | Status: DISCONTINUED | OUTPATIENT
Start: 2024-11-29 | End: 2024-12-06

## 2024-11-29 RX ORDER — RISPERIDONE 4 MG/1
3 TABLET ORAL AT BEDTIME
Refills: 0 | Status: DISCONTINUED | OUTPATIENT
Start: 2024-11-29 | End: 2024-12-04

## 2024-11-29 RX ADMIN — CLONIDINE HYDROCHLORIDE 0.2 MILLIGRAM(S): 0.3 TABLET ORAL at 08:02

## 2024-11-29 RX ADMIN — GABAPENTIN 800 MILLIGRAM(S): 300 CAPSULE ORAL at 08:03

## 2024-11-29 RX ADMIN — LIDOCAINE 1 PATCH: 40 CREAM TOPICAL at 20:51

## 2024-11-29 RX ADMIN — CLONIDINE HYDROCHLORIDE 0.2 MILLIGRAM(S): 0.3 TABLET ORAL at 20:15

## 2024-11-29 RX ADMIN — Medication 20 MILLIGRAM(S): at 08:02

## 2024-11-29 RX ADMIN — Medication 5 MILLIGRAM(S): at 20:51

## 2024-11-29 RX ADMIN — GABAPENTIN 800 MILLIGRAM(S): 300 CAPSULE ORAL at 12:21

## 2024-11-29 RX ADMIN — RISPERIDONE 3 MILLIGRAM(S): 4 TABLET ORAL at 20:15

## 2024-11-29 RX ADMIN — LAMOTRIGINE 100 MILLIGRAM(S): 50 TABLET, EXTENDED RELEASE ORAL at 08:03

## 2024-11-29 RX ADMIN — Medication 600 MILLIGRAM(S): at 12:21

## 2024-11-29 RX ADMIN — GABAPENTIN 800 MILLIGRAM(S): 300 CAPSULE ORAL at 20:15

## 2024-11-29 RX ADMIN — LAMOTRIGINE 100 MILLIGRAM(S): 50 TABLET, EXTENDED RELEASE ORAL at 20:15

## 2024-11-29 RX ADMIN — AMLODIPINE BESYLATE 5 MILLIGRAM(S): 10 TABLET ORAL at 08:03

## 2024-11-29 RX ADMIN — Medication 600 MILLIGRAM(S): at 12:47

## 2024-11-29 NOTE — BH INPATIENT PSYCHIATRY PROGRESS NOTE - NSBHCONSBHPROVDETAILS_PSY_A_CORE  FT
Dr. Cook ([phone]) - left VM and callback # Dr. Cook ([phone]) - left VM and callback # [phone]
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
## 2024-11-29 NOTE — BH INPATIENT PSYCHIATRY PROGRESS NOTE - NSBHCHARTREVIEWVS_PSY_A_CORE FT
Vital Signs Last 24 Hrs  T(C): 36.9 (11-29-24 @ 07:43), Max: 36.9 (11-29-24 @ 07:43)  T(F): 98.4 (11-29-24 @ 07:43), Max: 98.4 (11-29-24 @ 07:43)  HR: 90 (11-29-24 @ 07:43) (90 - 111)  BP: 120/84 (11-29-24 @ 07:43) (120/84 - 124/88)  BP(mean): --  RR: 20 (11-28-24 @ 16:03) (20 - 20)  SpO2: --

## 2024-11-29 NOTE — BH INPATIENT PSYCHIATRY PROGRESS NOTE - NSBHMETABOLIC_PSY_ALL_CORE_FT
BMI: BMI (kg/m2): 32.7 (11-24-24 @ 01:11)  HbA1c: A1C with Estimated Average Glucose Result: 5.3 % (11-24-24 @ 07:26)    Glucose: POCT Blood Glucose.: 82 mg/dL (11-23-24 @ 16:18)    BP: 120/84 (11-29-24 @ 07:43) (112/80 - 124/88)Vital Signs Last 24 Hrs  T(C): 36.9 (11-29-24 @ 07:43), Max: 36.9 (11-29-24 @ 07:43)  T(F): 98.4 (11-29-24 @ 07:43), Max: 98.4 (11-29-24 @ 07:43)  HR: 90 (11-29-24 @ 07:43) (90 - 111)  BP: 120/84 (11-29-24 @ 07:43) (120/84 - 124/88)  BP(mean): --  RR: 20 (11-28-24 @ 16:03) (20 - 20)  SpO2: --      Lipid Panel: Date/Time: 11-24-24 @ 07:26  Cholesterol, Serum: 184  LDL Cholesterol Calculated: 123  HDL Cholesterol, Serum: 51  Total Cholesterol/HDL Ration Measurement: --  Triglycerides, Serum: 50

## 2024-11-29 NOTE — BH INPATIENT PSYCHIATRY PROGRESS NOTE - CURRENT MEDICATION
MEDICATIONS  (STANDING):  amLODIPine   Tablet 5 milliGRAM(s) Oral daily  cloNIDine 0.2 milliGRAM(s) Oral two times a day  DULoxetine 20 milliGRAM(s) Oral daily  gabapentin 800 milliGRAM(s) Oral three times a day  lamoTRIgine 100 milliGRAM(s) Oral two times a day  risperiDONE   Tablet 3 milliGRAM(s) Oral at bedtime    MEDICATIONS  (PRN):  cyclobenzaprine 5 milliGRAM(s) Oral three times a day PRN Muscle Spasm  diphenhydrAMINE 50 milliGRAM(s) Oral every 6 hours PRN Combative behavior  haloperidol     Tablet 5 milliGRAM(s) Oral every 6 hours PRN agitation  ibuprofen  Tablet. 600 milliGRAM(s) Oral every 12 hours PRN Mild Pain (1 - 3), Moderate Pain (4 - 6)  LORazepam     Tablet 2 milliGRAM(s) Oral every 6 hours PRN Agitation

## 2024-11-29 NOTE — BH INPATIENT PSYCHIATRY PROGRESS NOTE - OTHER
florencioa  Patient describes females "snickering off" in the hallway last night. This may be an AH, however it is also plausible that it was reality. Patient describes females "snickering off" in the hallway last night. This may be an AH, however it is also plausible that it was reality. Denies current AH.

## 2024-11-30 RX ADMIN — LIDOCAINE 1 PATCH: 40 CREAM TOPICAL at 08:06

## 2024-11-30 RX ADMIN — LAMOTRIGINE 100 MILLIGRAM(S): 50 TABLET, EXTENDED RELEASE ORAL at 20:02

## 2024-11-30 RX ADMIN — Medication 20 MILLIGRAM(S): at 08:00

## 2024-11-30 RX ADMIN — LIDOCAINE 1 PATCH: 40 CREAM TOPICAL at 08:01

## 2024-11-30 RX ADMIN — LIDOCAINE 1 PATCH: 40 CREAM TOPICAL at 22:00

## 2024-11-30 RX ADMIN — Medication 600 MILLIGRAM(S): at 08:00

## 2024-11-30 RX ADMIN — RISPERIDONE 3 MILLIGRAM(S): 4 TABLET ORAL at 20:02

## 2024-11-30 RX ADMIN — GABAPENTIN 800 MILLIGRAM(S): 300 CAPSULE ORAL at 12:39

## 2024-11-30 RX ADMIN — CLONIDINE HYDROCHLORIDE 0.2 MILLIGRAM(S): 0.3 TABLET ORAL at 08:00

## 2024-11-30 RX ADMIN — CLONIDINE HYDROCHLORIDE 0.2 MILLIGRAM(S): 0.3 TABLET ORAL at 20:02

## 2024-11-30 RX ADMIN — Medication 600 MILLIGRAM(S): at 20:30

## 2024-11-30 RX ADMIN — LIDOCAINE 1 PATCH: 40 CREAM TOPICAL at 12:36

## 2024-11-30 RX ADMIN — Medication 5 MILLIGRAM(S): at 16:46

## 2024-11-30 RX ADMIN — LAMOTRIGINE 100 MILLIGRAM(S): 50 TABLET, EXTENDED RELEASE ORAL at 08:00

## 2024-11-30 RX ADMIN — GABAPENTIN 800 MILLIGRAM(S): 300 CAPSULE ORAL at 20:02

## 2024-11-30 RX ADMIN — LIDOCAINE 1 PATCH: 40 CREAM TOPICAL at 19:05

## 2024-11-30 RX ADMIN — AMLODIPINE BESYLATE 5 MILLIGRAM(S): 10 TABLET ORAL at 08:00

## 2024-11-30 RX ADMIN — GABAPENTIN 800 MILLIGRAM(S): 300 CAPSULE ORAL at 08:00

## 2024-11-30 RX ADMIN — Medication 600 MILLIGRAM(S): at 08:01

## 2024-11-30 RX ADMIN — Medication 600 MILLIGRAM(S): at 20:02

## 2024-12-01 RX ORDER — MAGNESIUM, ALUMINUM HYDROXIDE 200-225/5
30 SUSPENSION, ORAL (FINAL DOSE FORM) ORAL EVERY 6 HOURS
Refills: 0 | Status: DISCONTINUED | OUTPATIENT
Start: 2024-12-01 | End: 2024-12-06

## 2024-12-01 RX ADMIN — RISPERIDONE 3 MILLIGRAM(S): 4 TABLET ORAL at 20:13

## 2024-12-01 RX ADMIN — GABAPENTIN 800 MILLIGRAM(S): 300 CAPSULE ORAL at 08:40

## 2024-12-01 RX ADMIN — LORAZEPAM 2 MILLIGRAM(S): 2 TABLET ORAL at 13:43

## 2024-12-01 RX ADMIN — LIDOCAINE 1 PATCH: 40 CREAM TOPICAL at 13:38

## 2024-12-01 RX ADMIN — GABAPENTIN 800 MILLIGRAM(S): 300 CAPSULE ORAL at 14:51

## 2024-12-01 RX ADMIN — LAMOTRIGINE 100 MILLIGRAM(S): 50 TABLET, EXTENDED RELEASE ORAL at 20:13

## 2024-12-01 RX ADMIN — CLONIDINE HYDROCHLORIDE 0.2 MILLIGRAM(S): 0.3 TABLET ORAL at 20:13

## 2024-12-01 RX ADMIN — LORAZEPAM 2 MILLIGRAM(S): 2 TABLET ORAL at 20:13

## 2024-12-01 RX ADMIN — Medication 5 MILLIGRAM(S): at 04:17

## 2024-12-01 RX ADMIN — GABAPENTIN 800 MILLIGRAM(S): 300 CAPSULE ORAL at 20:13

## 2024-12-01 RX ADMIN — Medication 600 MILLIGRAM(S): at 08:45

## 2024-12-01 RX ADMIN — Medication 20 MILLIGRAM(S): at 08:40

## 2024-12-01 RX ADMIN — LAMOTRIGINE 100 MILLIGRAM(S): 50 TABLET, EXTENDED RELEASE ORAL at 08:41

## 2024-12-01 RX ADMIN — Medication 600 MILLIGRAM(S): at 08:42

## 2024-12-01 RX ADMIN — CLONIDINE HYDROCHLORIDE 0.2 MILLIGRAM(S): 0.3 TABLET ORAL at 08:40

## 2024-12-01 RX ADMIN — AMLODIPINE BESYLATE 5 MILLIGRAM(S): 10 TABLET ORAL at 08:41

## 2024-12-02 PROCEDURE — 99232 SBSQ HOSP IP/OBS MODERATE 35: CPT

## 2024-12-02 RX ADMIN — LAMOTRIGINE 100 MILLIGRAM(S): 50 TABLET, EXTENDED RELEASE ORAL at 08:15

## 2024-12-02 RX ADMIN — GABAPENTIN 800 MILLIGRAM(S): 300 CAPSULE ORAL at 08:15

## 2024-12-02 RX ADMIN — GABAPENTIN 800 MILLIGRAM(S): 300 CAPSULE ORAL at 20:04

## 2024-12-02 RX ADMIN — GABAPENTIN 800 MILLIGRAM(S): 300 CAPSULE ORAL at 12:24

## 2024-12-02 RX ADMIN — LIDOCAINE 1 PATCH: 40 CREAM TOPICAL at 12:41

## 2024-12-02 RX ADMIN — Medication 5 MILLIGRAM(S): at 20:22

## 2024-12-02 RX ADMIN — AMLODIPINE BESYLATE 5 MILLIGRAM(S): 10 TABLET ORAL at 08:15

## 2024-12-02 RX ADMIN — RISPERIDONE 3 MILLIGRAM(S): 4 TABLET ORAL at 20:03

## 2024-12-02 RX ADMIN — Medication 600 MILLIGRAM(S): at 13:41

## 2024-12-02 RX ADMIN — Medication 20 MILLIGRAM(S): at 08:12

## 2024-12-02 RX ADMIN — Medication 5 MILLIGRAM(S): at 13:42

## 2024-12-02 RX ADMIN — LAMOTRIGINE 100 MILLIGRAM(S): 50 TABLET, EXTENDED RELEASE ORAL at 20:03

## 2024-12-02 RX ADMIN — CLONIDINE HYDROCHLORIDE 0.2 MILLIGRAM(S): 0.3 TABLET ORAL at 20:03

## 2024-12-02 RX ADMIN — Medication 30 MILLILITER(S): at 20:03

## 2024-12-02 RX ADMIN — CLONIDINE HYDROCHLORIDE 0.2 MILLIGRAM(S): 0.3 TABLET ORAL at 08:12

## 2024-12-02 NOTE — BH INPATIENT PSYCHIATRY PROGRESS NOTE - NSBHMETABOLIC_PSY_ALL_CORE_FT
BMI: BMI (kg/m2): 32.7 (11-24-24 @ 01:11)  HbA1c: A1C with Estimated Average Glucose Result: 5.3 % (11-24-24 @ 07:26)    Glucose: POCT Blood Glucose.: 82 mg/dL (11-23-24 @ 16:18)    BP: 127/76 (12-02-24 @ 08:22) (111/77 - 140/93)Vital Signs Last 24 Hrs  T(C): 36.6 (12-02-24 @ 08:22), Max: 36.6 (12-02-24 @ 08:22)  T(F): 97.8 (12-02-24 @ 08:22), Max: 97.8 (12-02-24 @ 08:22)  HR: 93 (12-02-24 @ 08:22) (93 - 118)  BP: 127/76 (12-02-24 @ 08:22) (127/76 - 127/89)  BP(mean): --  RR: 16 (12-01-24 @ 16:08) (16 - 16)  SpO2: --      Lipid Panel: Date/Time: 11-24-24 @ 07:26  Cholesterol, Serum: 184  LDL Cholesterol Calculated: 123  HDL Cholesterol, Serum: 51  Total Cholesterol/HDL Ration Measurement: --  Triglycerides, Serum: 50

## 2024-12-02 NOTE — BH INPATIENT PSYCHIATRY PROGRESS NOTE - CURRENT MEDICATION
MEDICATIONS  (STANDING):  amLODIPine   Tablet 5 milliGRAM(s) Oral daily  cloNIDine 0.2 milliGRAM(s) Oral two times a day  DULoxetine 20 milliGRAM(s) Oral daily  gabapentin 800 milliGRAM(s) Oral three times a day  lamoTRIgine 100 milliGRAM(s) Oral two times a day  risperiDONE   Tablet 3 milliGRAM(s) Oral at bedtime    MEDICATIONS  (PRN):  aluminum hydroxide/magnesium hydroxide/simethicone Suspension 30 milliLiter(s) Oral every 6 hours PRN Dyspepsia  cyclobenzaprine 5 milliGRAM(s) Oral three times a day PRN Muscle Spasm  diphenhydrAMINE 50 milliGRAM(s) Oral every 6 hours PRN Combative behavior  haloperidol     Tablet 5 milliGRAM(s) Oral every 6 hours PRN agitation  ibuprofen  Tablet. 600 milliGRAM(s) Oral every 12 hours PRN Mild Pain (1 - 3), Moderate Pain (4 - 6)  lidocaine   4% Patch 1 Patch Transdermal daily PRN pain

## 2024-12-02 NOTE — BH INPATIENT PSYCHIATRY PROGRESS NOTE - NSBHCHARTREVIEWVS_PSY_A_CORE FT
Vital Signs Last 24 Hrs  T(C): 36.6 (12-02-24 @ 08:22), Max: 36.6 (12-02-24 @ 08:22)  T(F): 97.8 (12-02-24 @ 08:22), Max: 97.8 (12-02-24 @ 08:22)  HR: 93 (12-02-24 @ 08:22) (93 - 118)  BP: 127/76 (12-02-24 @ 08:22) (127/76 - 127/89)  BP(mean): --  RR: 16 (12-01-24 @ 16:08) (16 - 16)  SpO2: --
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
## 2024-12-02 NOTE — BH INPATIENT PSYCHIATRY PROGRESS NOTE - OTHER
Patient describes females "snickering off" in the hallway last night. This may be an AH, however it is also plausible that it was reality. Denies current AH. florencioa
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
## 2024-12-02 NOTE — BH CHART NOTE - NSEVENTNOTEFT_PSY_ALL_CORE
Spoke to patient's mom Teresa ([phone]) on 12/2/24. States she has spoken to patient recently over the phone and that patient "sounds like herself again." She does not have an concerns regarding discharge.  Spoke to patient's mom Teresa ([phone])- States she has spoken to patient recently over the phone and that patient "sounds like herself again." She does not have an concerns regarding discharge.

## 2024-12-03 PROCEDURE — 99232 SBSQ HOSP IP/OBS MODERATE 35: CPT

## 2024-12-03 RX ADMIN — Medication 5 MILLIGRAM(S): at 21:54

## 2024-12-03 RX ADMIN — GABAPENTIN 800 MILLIGRAM(S): 300 CAPSULE ORAL at 20:45

## 2024-12-03 RX ADMIN — LAMOTRIGINE 100 MILLIGRAM(S): 50 TABLET, EXTENDED RELEASE ORAL at 08:06

## 2024-12-03 RX ADMIN — AMLODIPINE BESYLATE 5 MILLIGRAM(S): 10 TABLET ORAL at 08:06

## 2024-12-03 RX ADMIN — Medication 600 MILLIGRAM(S): at 21:54

## 2024-12-03 RX ADMIN — RISPERIDONE 3 MILLIGRAM(S): 4 TABLET ORAL at 20:50

## 2024-12-03 RX ADMIN — CLONIDINE HYDROCHLORIDE 0.2 MILLIGRAM(S): 0.3 TABLET ORAL at 20:45

## 2024-12-03 RX ADMIN — Medication 30 MILLILITER(S): at 20:46

## 2024-12-03 RX ADMIN — Medication 5 MILLIGRAM(S): at 18:41

## 2024-12-03 RX ADMIN — LIDOCAINE 1 PATCH: 40 CREAM TOPICAL at 00:47

## 2024-12-03 RX ADMIN — GABAPENTIN 800 MILLIGRAM(S): 300 CAPSULE ORAL at 08:06

## 2024-12-03 RX ADMIN — Medication 600 MILLIGRAM(S): at 08:04

## 2024-12-03 RX ADMIN — Medication 50 MILLIGRAM(S): at 11:28

## 2024-12-03 RX ADMIN — CLONIDINE HYDROCHLORIDE 0.2 MILLIGRAM(S): 0.3 TABLET ORAL at 08:07

## 2024-12-03 RX ADMIN — Medication 600 MILLIGRAM(S): at 23:04

## 2024-12-03 RX ADMIN — Medication 600 MILLIGRAM(S): at 08:39

## 2024-12-03 RX ADMIN — Medication 20 MILLIGRAM(S): at 08:07

## 2024-12-03 RX ADMIN — LAMOTRIGINE 100 MILLIGRAM(S): 50 TABLET, EXTENDED RELEASE ORAL at 20:45

## 2024-12-03 RX ADMIN — GABAPENTIN 800 MILLIGRAM(S): 300 CAPSULE ORAL at 13:11

## 2024-12-03 NOTE — BH INPATIENT PSYCHIATRY PROGRESS NOTE - NSBHMETABOLIC_PSY_ALL_CORE_FT
BMI: BMI (kg/m2): 32.7 (11-24-24 @ 01:11)  HbA1c: A1C with Estimated Average Glucose Result: 5.3 % (11-24-24 @ 07:26)    Glucose: POCT Blood Glucose.: 82 mg/dL (11-23-24 @ 16:18)    BP: 136/82 (12-03-24 @ 08:39) (120/88 - 153/86)Vital Signs Last 24 Hrs  T(C): 36.6 (12-03-24 @ 08:39), Max: 36.6 (12-03-24 @ 08:39)  T(F): 97.8 (12-03-24 @ 08:39), Max: 97.8 (12-03-24 @ 08:39)  HR: 98 (12-03-24 @ 08:39) (98 - 121)  BP: 136/82 (12-03-24 @ 08:39) (136/82 - 153/86)  BP(mean): --  RR: --  SpO2: --      Lipid Panel: Date/Time: 11-24-24 @ 07:26  Cholesterol, Serum: 184  LDL Cholesterol Calculated: 123  HDL Cholesterol, Serum: 51  Total Cholesterol/HDL Ration Measurement: --  Triglycerides, Serum: 50

## 2024-12-03 NOTE — BH INPATIENT PSYCHIATRY PROGRESS NOTE - NSBHFUPINTERVALHXFT_PSY_A_CORE
Pt seen and evaluated, chart reviewed. Patient presents well related, polite. She reports: "I'm all right except of people coming to my room". She states that a staff member entered her room unexpectedly which made her uncomfortable. She also c/o HA at night after taking risperidone. She states "I never agreed to take this medications" and indicates she will be taking it only while she is here (ie., on IPP). She reports feeling "bored" with nothing for her to do on the unit, and is looking forward for discharge to be back with her daughter. She looks upset that she is not receiving Adderall. She denies AH/VH/SI/HI. She continues to report that she felt strange smell in the other hospital prior to the transfer.
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
## 2024-12-03 NOTE — BH INPATIENT PSYCHIATRY PROGRESS NOTE - NSBHCHARTREVIEWVS_PSY_A_CORE FT
Vital Signs Last 24 Hrs  T(C): 36.6 (12-03-24 @ 08:39), Max: 36.6 (12-03-24 @ 08:39)  T(F): 97.8 (12-03-24 @ 08:39), Max: 97.8 (12-03-24 @ 08:39)  HR: 98 (12-03-24 @ 08:39) (98 - 121)  BP: 136/82 (12-03-24 @ 08:39) (136/82 - 153/86)  BP(mean): --  RR: --  SpO2: --

## 2024-12-04 PROCEDURE — 99232 SBSQ HOSP IP/OBS MODERATE 35: CPT

## 2024-12-04 RX ORDER — RISPERIDONE 4 MG/1
2 TABLET ORAL AT BEDTIME
Refills: 0 | Status: DISCONTINUED | OUTPATIENT
Start: 2024-12-04 | End: 2024-12-06

## 2024-12-04 RX ORDER — RISPERIDONE 4 MG/1
1 TABLET ORAL DAILY
Refills: 0 | Status: DISCONTINUED | OUTPATIENT
Start: 2024-12-04 | End: 2024-12-06

## 2024-12-04 RX ADMIN — Medication 20 MILLIGRAM(S): at 08:15

## 2024-12-04 RX ADMIN — Medication 5 MILLIGRAM(S): at 15:34

## 2024-12-04 RX ADMIN — CLONIDINE HYDROCHLORIDE 0.2 MILLIGRAM(S): 0.3 TABLET ORAL at 08:15

## 2024-12-04 RX ADMIN — Medication 5 MILLIGRAM(S): at 20:20

## 2024-12-04 RX ADMIN — Medication 30 MILLILITER(S): at 20:20

## 2024-12-04 RX ADMIN — Medication 600 MILLIGRAM(S): at 21:33

## 2024-12-04 RX ADMIN — LIDOCAINE 1 PATCH: 40 CREAM TOPICAL at 10:22

## 2024-12-04 RX ADMIN — Medication 600 MILLIGRAM(S): at 10:38

## 2024-12-04 RX ADMIN — GABAPENTIN 800 MILLIGRAM(S): 300 CAPSULE ORAL at 08:13

## 2024-12-04 RX ADMIN — RISPERIDONE 2 MILLIGRAM(S): 4 TABLET ORAL at 20:19

## 2024-12-04 RX ADMIN — Medication 600 MILLIGRAM(S): at 09:37

## 2024-12-04 RX ADMIN — RISPERIDONE 1 MILLIGRAM(S): 4 TABLET ORAL at 16:48

## 2024-12-04 RX ADMIN — GABAPENTIN 800 MILLIGRAM(S): 300 CAPSULE ORAL at 12:03

## 2024-12-04 RX ADMIN — LAMOTRIGINE 100 MILLIGRAM(S): 50 TABLET, EXTENDED RELEASE ORAL at 20:20

## 2024-12-04 RX ADMIN — AMLODIPINE BESYLATE 5 MILLIGRAM(S): 10 TABLET ORAL at 08:14

## 2024-12-04 RX ADMIN — CLONIDINE HYDROCHLORIDE 0.2 MILLIGRAM(S): 0.3 TABLET ORAL at 20:20

## 2024-12-04 RX ADMIN — GABAPENTIN 800 MILLIGRAM(S): 300 CAPSULE ORAL at 20:20

## 2024-12-04 RX ADMIN — LAMOTRIGINE 100 MILLIGRAM(S): 50 TABLET, EXTENDED RELEASE ORAL at 08:14

## 2024-12-04 NOTE — BH DISCHARGE NOTE NURSING/SOCIAL WORK/PSYCH REHAB - NSDCADDINFO1FT_PSY_ALL_CORE
[phone] or [phone]    This is a virtual appointment patient should reach out to provider if there any connection issues  [phone] or [phone]    This is a virtual appointment patient should reach out to provider if there any connection issues.  Patient is interested in a connection to a therapist and was interested in connection to a therapist where she receives services.
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
## 2024-12-04 NOTE — BH INPATIENT PSYCHIATRY PROGRESS NOTE - NSBHADMITMEDEDUDETAILS_A_CORE FT
Reeducated on indication and RABs of current medication regimen.   Of note, pt reports adherence to lamictal 100 mg bid with last dose taken PTA 11/22; reeducated on RABs of lamictal and side effects profile with emphasis on SJS and increased risk if missed dose of 5 days or greater, pt verbalized understanding 

## 2024-12-04 NOTE — BH DISCHARGE NOTE NURSING/SOCIAL WORK/PSYCH REHAB - NSDCADDINFO2FT_PSY_ALL_CORE
Patient will be seen by provider at her home address listed above. Patient should reach out to provider if she has any concerns Patient will be seen by provider at her home address listed above. Patient should reach out to provider if she has any concerns    Patient is seeking connection to therapist.  D/C sent [email or web address] at patients request.

## 2024-12-04 NOTE — BH INPATIENT PSYCHIATRY PROGRESS NOTE - NSBHCHARTREVIEWVS_PSY_A_CORE FT
Vital Signs Last 24 Hrs  T(C): 36.8 (12-04-24 @ 08:41), Max: 37.2 (12-03-24 @ 16:19)  T(F): 98.2 (12-04-24 @ 08:41), Max: 98.9 (12-03-24 @ 16:19)  HR: 97 (12-04-24 @ 08:41) (97 - 112)  BP: 166/77 (12-04-24 @ 08:41) (143/91 - 166/77)  BP(mean): --  RR: --  SpO2: --

## 2024-12-04 NOTE — BH INPATIENT PSYCHIATRY PROGRESS NOTE - NSBHMETABOLIC_PSY_ALL_CORE_FT
BMI: BMI (kg/m2): 32.7 (11-24-24 @ 01:11)  HbA1c: A1C with Estimated Average Glucose Result: 5.3 % (11-24-24 @ 07:26)    Glucose: POCT Blood Glucose.: 82 mg/dL (11-23-24 @ 16:18)    BP: 166/77 (12-04-24 @ 08:41) (127/76 - 166/77)Vital Signs Last 24 Hrs  T(C): 36.8 (12-04-24 @ 08:41), Max: 37.2 (12-03-24 @ 16:19)  T(F): 98.2 (12-04-24 @ 08:41), Max: 98.9 (12-03-24 @ 16:19)  HR: 97 (12-04-24 @ 08:41) (97 - 112)  BP: 166/77 (12-04-24 @ 08:41) (143/91 - 166/77)  BP(mean): --  RR: --  SpO2: --      Lipid Panel: Date/Time: 11-24-24 @ 07:26  Cholesterol, Serum: 184  LDL Cholesterol Calculated: 123  HDL Cholesterol, Serum: 51  Total Cholesterol/HDL Ration Measurement: --  Triglycerides, Serum: 50

## 2024-12-04 NOTE — BH INPATIENT PSYCHIATRY PROGRESS NOTE - CURRENT MEDICATION
MEDICATIONS  (STANDING):  amLODIPine   Tablet 5 milliGRAM(s) Oral daily  cloNIDine 0.2 milliGRAM(s) Oral two times a day  DULoxetine 20 milliGRAM(s) Oral daily  gabapentin 800 milliGRAM(s) Oral three times a day  lamoTRIgine 100 milliGRAM(s) Oral two times a day  risperiDONE   Tablet 2 milliGRAM(s) Oral at bedtime  risperiDONE   Tablet 1 milliGRAM(s) Oral daily    MEDICATIONS  (PRN):  aluminum hydroxide/magnesium hydroxide/simethicone Suspension 30 milliLiter(s) Oral every 6 hours PRN Dyspepsia  cyclobenzaprine 5 milliGRAM(s) Oral three times a day PRN Muscle Spasm  diphenhydrAMINE 50 milliGRAM(s) Oral every 6 hours PRN Combative behavior  haloperidol     Tablet 5 milliGRAM(s) Oral every 6 hours PRN agitation  ibuprofen  Tablet. 600 milliGRAM(s) Oral every 12 hours PRN Mild Pain (1 - 3), Moderate Pain (4 - 6)  lidocaine   4% Patch 1 Patch Transdermal daily PRN pain

## 2024-12-04 NOTE — BH DISCHARGE NOTE NURSING/SOCIAL WORK/PSYCH REHAB - PATIENT PORTAL LINK FT
You can access the FollowMyHealth Patient Portal offered by [facility] by registering at the following website: [email or web address]. By joining [facility]’s FollowMyHealth portal, you will also be able to view your health information using other applications (apps) compatible with our system.

## 2024-12-04 NOTE — BH INPATIENT PSYCHIATRY PROGRESS NOTE - NSBHFUPINTERVALHXFT_PSY_A_CORE
Pt seen and evaluated, chart reviewed. As per nursing report, pt c/o headache this morning, otherwise no other acute events. On evaluation, pt presents linear and cooperative, well related, agrees to interview privately in room. She reports she is "ok" and inquiries on discharge, reports goals to f/u CPS and see her children. She reports fair mood, sleep and appetite. She denies AH or VH. She reports worry that her food "is being tampered with" because she has "too many requests", states she has been eating her meals with no issues. She reports feeling safe on discharge, denies others are trying to harm/follow her. She denies SI/HI. She is adherent to medications, reports HA in the morning, agrees to changing risperdal dosing. Pt has been visible on unit and has not been a behavioral concern.  In no apparent distress. normal (ped)...

## 2024-12-05 PROCEDURE — 73030 X-RAY EXAM OF SHOULDER: CPT | Mod: 26,LT

## 2024-12-05 PROCEDURE — 99232 SBSQ HOSP IP/OBS MODERATE 35: CPT

## 2024-12-05 RX ORDER — ACETAMINOPHEN 500MG 500 MG/1
650 TABLET, COATED ORAL EVERY 6 HOURS
Refills: 0 | Status: DISCONTINUED | OUTPATIENT
Start: 2024-12-05 | End: 2024-12-06

## 2024-12-05 RX ORDER — RISPERIDONE 4 MG/1
1 TABLET ORAL
Qty: 14 | Refills: 0
Start: 2024-12-05 | End: 2024-12-18

## 2024-12-05 RX ORDER — LORAZEPAM 2 MG/1
1 TABLET ORAL ONCE
Refills: 0 | Status: DISCONTINUED | OUTPATIENT
Start: 2024-12-05 | End: 2024-12-05

## 2024-12-05 RX ADMIN — AMLODIPINE BESYLATE 5 MILLIGRAM(S): 10 TABLET ORAL at 08:12

## 2024-12-05 RX ADMIN — GABAPENTIN 800 MILLIGRAM(S): 300 CAPSULE ORAL at 20:08

## 2024-12-05 RX ADMIN — Medication 5 MILLIGRAM(S): at 09:23

## 2024-12-05 RX ADMIN — LAMOTRIGINE 100 MILLIGRAM(S): 50 TABLET, EXTENDED RELEASE ORAL at 20:08

## 2024-12-05 RX ADMIN — LIDOCAINE 1 PATCH: 40 CREAM TOPICAL at 19:30

## 2024-12-05 RX ADMIN — Medication 600 MILLIGRAM(S): at 13:19

## 2024-12-05 RX ADMIN — CLONIDINE HYDROCHLORIDE 0.2 MILLIGRAM(S): 0.3 TABLET ORAL at 08:12

## 2024-12-05 RX ADMIN — ACETAMINOPHEN 500MG 650 MILLIGRAM(S): 500 TABLET, COATED ORAL at 10:28

## 2024-12-05 RX ADMIN — GABAPENTIN 800 MILLIGRAM(S): 300 CAPSULE ORAL at 12:36

## 2024-12-05 RX ADMIN — Medication 50 MILLIGRAM(S): at 20:09

## 2024-12-05 RX ADMIN — RISPERIDONE 1 MILLIGRAM(S): 4 TABLET ORAL at 08:12

## 2024-12-05 RX ADMIN — CLONIDINE HYDROCHLORIDE 0.2 MILLIGRAM(S): 0.3 TABLET ORAL at 20:08

## 2024-12-05 RX ADMIN — LIDOCAINE 1 PATCH: 40 CREAM TOPICAL at 10:35

## 2024-12-05 RX ADMIN — LORAZEPAM 1 MILLIGRAM(S): 2 TABLET ORAL at 20:32

## 2024-12-05 RX ADMIN — GABAPENTIN 800 MILLIGRAM(S): 300 CAPSULE ORAL at 08:12

## 2024-12-05 RX ADMIN — Medication 20 MILLIGRAM(S): at 08:12

## 2024-12-05 RX ADMIN — ACETAMINOPHEN 500MG 650 MILLIGRAM(S): 500 TABLET, COATED ORAL at 09:26

## 2024-12-05 RX ADMIN — LIDOCAINE 1 PATCH: 40 CREAM TOPICAL at 22:30

## 2024-12-05 RX ADMIN — RISPERIDONE 2 MILLIGRAM(S): 4 TABLET ORAL at 20:08

## 2024-12-05 RX ADMIN — LAMOTRIGINE 100 MILLIGRAM(S): 50 TABLET, EXTENDED RELEASE ORAL at 08:12

## 2024-12-05 RX ADMIN — Medication 600 MILLIGRAM(S): at 12:24

## 2024-12-05 NOTE — BH TREATMENT PLAN - NSTXSUICIDINTERRN_PSY_ALL_CORE
RN will monitor for s/sx of suicidality  RN will offer PRN meds
RN to assess patients behavior and be alert for increased signs of impulsivity/agitation.  RN will monitor patient and provide support and comfort and provedie safety on unit.    RN to encourage medication compliance and provide support and education as needed on Dx, coping skills, medication, and safety planning.  RN to Encourage daily ADL's  RN to Encourage group attendance  RN will assess and intervene for any suicidal ideations

## 2024-12-05 NOTE — BH INPATIENT PSYCHIATRY PROGRESS NOTE - NSBHFUPINTERVALHXFT_PSY_A_CORE
Pt seen and evaluated, chart reviewed. As per nursing report, pt c/o left shoulder pain, otherwise no other acute events. On evaluation, pt presents linear and cooperative, well related, agrees to interview privately in room. She reports she is "ok" with fair mood, sleep and appetite. She denies AVH. She denies safety concerns, denies others are trying to harm/follow her. She denies SI/HI. She is adherent to medications, denies negative side effects. She is future-oriented with improving insight, able to verbalize benefits of adhering to medications and agrees to OP f/u. Pt has been visible on unit and has not been a behavioral concern. Anticipated discharge for tomorrow.   Spoke with pt's mother, Teresa ([phone])- denies safety concerns on discharge tomorrow. Pt seen and evaluated, chart reviewed. As per nursing report, pt c/o left shoulder pain, otherwise no other acute events; hospitalist consulted, recs ordered. On evaluation, pt presents linear and cooperative, well related, agrees to interview privately in room. She reports she is "ok" with fair mood, sleep and appetite. She denies AVH. She denies safety concerns, denies others are trying to harm/follow her. She denies SI/HI. She is adherent to medications, denies negative side effects. She is future-oriented with improving insight, able to verbalize benefits of adhering to medications and agrees to OP f/u. Pt has been visible on unit and has not been a behavioral concern. Anticipated discharge for tomorrow.   Spoke with pt's mother, Teresa ([phone])- denies safety concerns on discharge tomorrow. Pt seen and evaluated, chart reviewed. As per nursing report, no acute events overnight; c/o left shoulder pain, hospitalist consulted, imaging negative, to f/u OP. On evaluation, pt presents linear and cooperative, well related, agrees to interview privately in room. She reports she is "ok" with fair mood, sleep and appetite. She denies AVH. She denies safety concerns, denies others are trying to harm/follow her. She denies SI/HI. She is adherent to medications, denies negative side effects. She is future-oriented with improving insight, able to verbalize benefits of adhering to medications and agrees to OP f/u. Pt has been visible on unit and has not been a behavioral concern. Anticipated discharge for tomorrow.   Spoke with pt's mother, Teresa ([phone])- denies safety concerns on discharge tomorrow. (4) no impairment

## 2024-12-05 NOTE — BH TREATMENT PLAN - NSCMSPTSTRENGTHS_PSY_ALL_CORE
Assertive/Compliance to treatment/Expressive of emotions/Future/goal oriented/Positive attitude/Resourceful/Self confidence
Assertive/Compliance to treatment/Expressive of emotions/Future/goal oriented/Intact family/Resourceful/Supportive family

## 2024-12-05 NOTE — BH INPATIENT PSYCHIATRY DISCHARGE NOTE - NSDCMRMEDTOKEN_GEN_ALL_CORE_FT
Adderall 30 mg oral tablet: 1 tab(s) orally 2 times a day MDD: 2  cloNIDine 0.2 mg oral tablet: 1 tab(s) orally 2 times a day  Cymbalta 20 mg oral delayed release capsule: 1 cap(s) orally once a day  famotidine 40 mg oral tablet: 1 tab(s) orally once a day  gabapentin 800 mg oral tablet: 1 tab(s) orally 3 times a day  LaMICtal 100 mg oral tablet: 1 tab(s) orally 2 times a day  Norvasc 5 mg oral tablet: 1 tab(s) orally once a day  risperiDONE 1 mg oral tablet: 1 tab(s) orally once a day x 14 days Continue to take as prescribed until told otherwise by your provider  risperiDONE 2 mg oral tablet: 1 tab(s) orally once a day (at bedtime) x 14 days Continue to take as prescribed until told otherwise by your provider   cloNIDine 0.2 mg oral tablet: 1 tab(s) orally 2 times a day  Cymbalta 20 mg oral delayed release capsule: 1 cap(s) orally once a day  famotidine 40 mg oral tablet: 1 tab(s) orally once a day  gabapentin 800 mg oral tablet: 1 tab(s) orally 3 times a day  LaMICtal 100 mg oral tablet: 1 tab(s) orally 2 times a day  Norvasc 5 mg oral tablet: 1 tab(s) orally once a day  risperiDONE 1 mg oral tablet: 1 tab(s) orally once a day x 14 days Continue to take as prescribed until told otherwise by your provider  risperiDONE 2 mg oral tablet: 1 tab(s) orally once a day (at bedtime) x 14 days Continue to take as prescribed until told otherwise by your provider

## 2024-12-05 NOTE — BH TREATMENT PLAN - NSTXPSYCHOGOAL_PSY_ALL_CORE
Will identify 1 trigger/stressor that exacerbates thoughts
State that he/she is only about 50% sure of the certainty of the delusions instead of 100% certain

## 2024-12-05 NOTE — CHART NOTE - NSCHARTNOTEFT_GEN_A_CORE
RYAN called Teresa Chu mother who was in agreement to d.c to her home [phone]. Address was confirmed as [address] [place] [address]
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
## 2024-12-05 NOTE — BH INPATIENT PSYCHIATRY DISCHARGE NOTE - NSDCCPCAREPLAN_GEN_ALL_CORE_FT
PRINCIPAL DISCHARGE DIAGNOSIS  Diagnosis: Major depressive disorder with psychotic features  Assessment and Plan of Treatment: Continue current medication regimen and follow up with aftercare appointments  R/o schizophrenia vs schizoaffective disorder      SECONDARY DISCHARGE DIAGNOSES  Diagnosis: Post traumatic stress disorder (PTSD)  Assessment and Plan of Treatment: Continue current medication regimen and follow up with aftercare appointments

## 2024-12-05 NOTE — BH CHART NOTE - NSEVENTNOTEFT_PSY_ALL_CORE
Provider notified that patient was pacing, anxiety, and accusing staff of following her. She was unable to be redirected by staff. Provider ordered PO Ativan 1 mg STAT for patient's agitation.

## 2024-12-05 NOTE — BH TREATMENT PLAN - NSTXPLANTHERAPYSESSIONSFT_PSY_ALL_CORE
11-26-24  Type of therapy: Coping skills, Creative arts therapy, Inspiration and motiviation, Leisure development, Self esteem, Social skills training, Stress management, Symptom management  Type of session: Individual  Level of patient participation: Resistance to participation  Duration of participation: 15 minutes  Therapy conducted by: Psych rehab  Therapy Summary: Writer tried to engage with pt yesterday morning to invite her to community meeting, pt was slightly irritated saying that she wants to go home to take care of her daughter. Staff note that pt's mood is low, pt states that she "does not understand why I'm here" and expresses concerns on how her family will treat her daughter in her absence. Per chart, pt also expressed that her depression worsening due to recent family losses and current housing instability, supported by collateral from her mother. Pt will need increased encouragement to be OOR and to attend group activities on the unit.  
  12-03-24  Type of therapy: Coping skills, Creative arts therapy, Inspiration and motiviation, Leisure development, Self esteem, Social skills training, Stress management, Symptom management  Type of session: Group  Level of patient participation: Engaged, Participates  Duration of participation: 45 minutes  Therapy conducted by: Psych rehab  Therapy Summary: Pt is attending RT sessions. She tries to engage in creative arts but states she is more of a "hands on" type of person and learner. She is better focused and engaged in bracelet making and when playing JYOTI with peers. She socializes with select peers and staff during group, often heard speaking either about her 12 y/o daughter or how she would like to be discharged to take care of family matters. Pt is cooperative during group and demonstrates good behavioral control, visible on the unit outside of sessions as well watching TV in either dayroom.    12-04-24  Type of therapy: Dialectical behavior therapy, Coping skills  Type of session: Group  Level of patient participation: Attentive, Disruptive, Engaged, Participates, Participated with encouragement  Duration of participation: 45 minutes  Therapy conducted by: Social work  Therapy Summary: Patient attended the Crisis Skills Group with  and peers. The TIPP Skill was reviewed which identifies ways to help us cope with extreme emotions (by “tipping” our body chemistry). The following skills were reviewed: “Temperature, Intense Exercise, Paced Breathing, and Progressive Muscle Relaxation”. Patients offered support and feedback while  facilitated the group.     Maggi was an active participant during the group today. She appeared engaged and open to the learning material being presented. At times, she had to be redirected, would have a side conversation but she was very eager to learn about these coping skills. Pt stayed for the groups duration and wants to work on utilizing the tip the temperature skill and paced breathing as coping skills. Pt remained present for the group's duration, gave feedback on the benefits of social work groups.

## 2024-12-05 NOTE — BH INPATIENT PSYCHIATRY PROGRESS NOTE - NSICDXBHTERTIARYDX_PSY_ALL_CORE
R/O Major depressive disorder with psychotic features   F32.3  R/O Schizophrenia   F20.9  R/O Schizoaffective disorder   F25.9   R/O Schizophrenia   F20.9  R/O Schizoaffective disorder   F25.9  R/O Major depressive disorder with psychotic features   F32.3
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
## 2024-12-05 NOTE — BH INPATIENT PSYCHIATRY PROGRESS NOTE - NSBHMETABOLIC_PSY_ALL_CORE_FT
BMI: BMI (kg/m2): 32.7 (11-24-24 @ 01:11)  HbA1c: A1C with Estimated Average Glucose Result: 5.3 % (11-24-24 @ 07:26)    Glucose: POCT Blood Glucose.: 82 mg/dL (11-23-24 @ 16:18)    BP: 122/75 (12-05-24 @ 08:26) (122/75 - 166/77)Vital Signs Last 24 Hrs  T(C): 36.4 (12-05-24 @ 08:26), Max: 36.7 (12-04-24 @ 16:02)  T(F): 97.6 (12-05-24 @ 08:26), Max: 98 (12-04-24 @ 16:02)  HR: 104 (12-05-24 @ 08:26) (104 - 112)  BP: 122/75 (12-05-24 @ 08:26) (122/75 - 158/90)  BP(mean): --  RR: --  SpO2: --      Lipid Panel: Date/Time: 11-24-24 @ 07:26  Cholesterol, Serum: 184  LDL Cholesterol Calculated: 123  HDL Cholesterol, Serum: 51  Total Cholesterol/HDL Ration Measurement: --  Triglycerides, Serum: 50

## 2024-12-05 NOTE — BH TREATMENT PLAN - NSTXDEPRESINTERPR_PSY_ALL_CORE
DE will continue to offer support and provide ongoing encouragement to attend groups on the unit
SC will offer support and provide encouragement to attend RT sessions to develop coping skills and leisure interests while on the unit

## 2024-12-05 NOTE — BH TREATMENT PLAN - NSTXSUICIDINTERMD_PSY_ALL_CORE
Medications management, encourage groups/DBT, safety plan, suicide precautions
Medications management, encourage groups/DBT, safety plan, suicide precautions
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
## 2024-12-05 NOTE — BH INPATIENT PSYCHIATRY DISCHARGE NOTE - NSBHDCMEDICALFT_PSY_A_CORE
Medicine consulted, Patient Endorse medical History of Hypertension and pre-DM   "BP is well controlled, continue with antihypertensive   Recent A1C 5.3% does not favor Pre-DM  Continue O/P f/up with PCP to monitor A1C's   Vitals and labs are grossly WNL "    Pt initially endorsed chronic pains, later reported new onset left shoulder pain which she attributed to emergency care prior to transfer to [facility]. Hospitalist consulted and imaging negative, to f/u OP Patient reported pmh of epilepsy, confirmed by collateral, on lamictal 100 mg bid which pt endorsed adherence, endorsed concern of breakthrough seizures. Neurology consulted, vEEG negative, no episodes during admission, f/u outpatient neurologist.    Patient endorsed medical History of Hypertension and pre-DM, medicine consulted-  "BP is well controlled, continue with antihypertensive   Recent A1C 5.3% does not favor Pre-DM  Continue O/P f/up with PCP to monitor A1C's   Vitals and labs are grossly WNL "    Pt initially endorsed chronic pains, later reported new onset left shoulder pain which she attributed to emergency care prior to transfer to [facility]. Hospitalist consulted and imaging negative, to f/u OP

## 2024-12-05 NOTE — BH INPATIENT PSYCHIATRY PROGRESS NOTE - NSBHCHARTREVIEWVS_PSY_A_CORE FT
Vital Signs Last 24 Hrs  T(C): 36.4 (12-05-24 @ 08:26), Max: 36.7 (12-04-24 @ 16:02)  T(F): 97.6 (12-05-24 @ 08:26), Max: 98 (12-04-24 @ 16:02)  HR: 104 (12-05-24 @ 08:26) (104 - 112)  BP: 122/75 (12-05-24 @ 08:26) (122/75 - 158/90)  BP(mean): --  RR: --  SpO2: --

## 2024-12-05 NOTE — BH INPATIENT PSYCHIATRY DISCHARGE NOTE - ATTENDING DISCHARGE PHYSICAL EXAMINATION:
Interviewed patient with the NP Kaylyn. Patient reports feeling "aggravated" due to her daughter being in custody, but discharge-oriented without SI/HI and with fair mood. She is making appropriate plans: calling a , getting her stuff out of shelter, etc. Agree with the assessment and plan, discharge today.

## 2024-12-05 NOTE — BH INPATIENT PSYCHIATRY PROGRESS NOTE - DETAILS
left shoulder pain, chronic generalized pain
scattered bruises on SHERRILL
scattered bruises in SHERRILL

## 2024-12-05 NOTE — BH INPATIENT PSYCHIATRY PROGRESS NOTE - CURRENT MEDICATION
MEDICATIONS  (STANDING):  amLODIPine   Tablet 5 milliGRAM(s) Oral daily  cloNIDine 0.2 milliGRAM(s) Oral two times a day  DULoxetine 20 milliGRAM(s) Oral daily  gabapentin 800 milliGRAM(s) Oral three times a day  lamoTRIgine 100 milliGRAM(s) Oral two times a day  risperiDONE   Tablet 2 milliGRAM(s) Oral at bedtime  risperiDONE   Tablet 1 milliGRAM(s) Oral daily    MEDICATIONS  (PRN):  acetaminophen     Tablet .. 650 milliGRAM(s) Oral every 6 hours PRN Mild Pain (1 - 3)  aluminum hydroxide/magnesium hydroxide/simethicone Suspension 30 milliLiter(s) Oral every 6 hours PRN Dyspepsia  cyclobenzaprine 5 milliGRAM(s) Oral three times a day PRN Muscle Spasm  diphenhydrAMINE 50 milliGRAM(s) Oral every 6 hours PRN Combative behavior  haloperidol     Tablet 5 milliGRAM(s) Oral every 6 hours PRN agitation  ibuprofen  Tablet. 600 milliGRAM(s) Oral every 12 hours PRN Mild Pain (1 - 3), Moderate Pain (4 - 6)  lidocaine   4% Patch 1 Patch Transdermal daily PRN pain   MEDICATIONS  (STANDING):  amLODIPine   Tablet 5 milliGRAM(s) Oral daily  cloNIDine 0.2 milliGRAM(s) Oral two times a day  DULoxetine 20 milliGRAM(s) Oral daily  gabapentin 800 milliGRAM(s) Oral three times a day  lamoTRIgine 100 milliGRAM(s) Oral two times a day  risperiDONE   Tablet 1 milliGRAM(s) Oral daily  risperiDONE   Tablet 2 milliGRAM(s) Oral at bedtime    MEDICATIONS  (PRN):  acetaminophen     Tablet .. 650 milliGRAM(s) Oral every 6 hours PRN Mild Pain (1 - 3)  aluminum hydroxide/magnesium hydroxide/simethicone Suspension 30 milliLiter(s) Oral every 6 hours PRN Dyspepsia  cyclobenzaprine 5 milliGRAM(s) Oral three times a day PRN Muscle Spasm  diphenhydrAMINE 50 milliGRAM(s) Oral every 6 hours PRN Combative behavior  haloperidol     Tablet 5 milliGRAM(s) Oral every 6 hours PRN agitation  ibuprofen  Tablet. 600 milliGRAM(s) Oral every 12 hours PRN Mild Pain (1 - 3), Moderate Pain (4 - 6)  lidocaine   4% Patch 1 Patch Transdermal daily PRN pain

## 2024-12-05 NOTE — BH INPATIENT PSYCHIATRY DISCHARGE NOTE - HOSPITAL COURSE
Pt has made significant progress over the course of hospitalization. With continuous psychotherapy from the treatment team and the medications, she reports feeling better, sleeping and eating well. Thought process and insight improved. Pt denied AH or VH. She endorsed improved paranoia, denied others were trying to harm/follow her. Medications adjusted as follows- started on risperdal to target psychosis, titrated risperdal 1 mg qAM/ 2 mg qHS; resumed home medication of cymbalta 20 mg daily; held adderall. Pt was calm and cooperative and was in good behavioral control. Denied any suicidal or homicidal ideations. Pt with good ADLs. Pt able to vocalize and utilize prosocial behaviors to address needs, and engage appropriately with staff and group milieu. Abnormalities in mental status improved sufficiently to permit outgoing care in the outpatient setting. Pt was evaluated by treatment team, pt is stable for discharge and shows no imminent danger to self, others or property at this time. She understands and agrees with treatment plan and following up with outpatient. Psychoeducation provided regarding diagnosis, medications, treatment and follow up. Risks, benefits, alternatives discussed, all questions and concerns addressed and answered. Reviewed crisis intervention with verbalized understanding. Pt has made significant progress over the course of hospitalization. With continuous psychotherapy from the treatment team and the medications, she reports feeling better, sleeping and eating well. Thought process and insight improved. Pt denied AH or VH. She endorsed improved paranoia, denied others were trying to harm/follow her. Medications adjusted as follows- started on risperdal to target psychosis, titrated risperdal 1 mg qAM/ 2 mg qHS; resumed home medications of cymbalta 20 mg daily for mood and gabapentin 800 mg tid for anxiety; held adderall. Pt was calm and cooperative and was in good behavioral control. Denied any suicidal or homicidal ideations. Pt with good ADLs. Pt able to vocalize and utilize prosocial behaviors to address needs, and engage appropriately with staff and group milieu. Abnormalities in mental status improved sufficiently to permit outgoing care in the outpatient setting. Pt was evaluated by treatment team, pt is stable for discharge and shows no imminent danger to self, others or property at this time. She understands and agrees with treatment plan and following up with outpatient. Psychoeducation provided regarding diagnosis, medications, treatment and follow up. Risks, benefits, alternatives discussed, all questions and concerns addressed and answered. Reviewed crisis intervention with verbalized understanding. Pt has made significant progress over the course of hospitalization. With continuous psychotherapy from the treatment team and the medications, she reports feeling better, sleeping and eating well. Thought process and insight improved. Pt endorsed resolution of AH, denied VH. She is with improved paranoia, denied others were trying to harm/follow her. Medications adjusted as follows- started on risperdal to target psychosis, titrated risperdal 1 mg qAM/ 2 mg qHS; resumed home medications of cymbalta 20 mg daily for mood and gabapentin 800 mg tid for anxiety; held adderall d/t concern of exacerbating psychosis. Pt was calm and cooperative and was in good behavioral control. Denied any suicidal or homicidal ideations. Pt with good ADLs. Pt able to vocalize and utilize prosocial behaviors to address needs, and engage appropriately with staff and group milieu. Abnormalities in mental status improved sufficiently to permit outgoing care in the outpatient setting. Pt was evaluated by treatment team, pt is stable for discharge and shows no imminent danger to self, others or property at this time. She understands and agrees with treatment plan and following up with outpatient. Psychoeducation provided regarding diagnosis, medications, treatment and follow up. Risks, benefits, alternatives discussed, all questions and concerns addressed and answered. Reviewed crisis intervention with verbalized understanding.

## 2024-12-05 NOTE — BH INPATIENT PSYCHIATRY DISCHARGE NOTE - DETAILS
PTSD hx, details deferred at this time Per ED documentation, mother activated CPS for pt's 13 year old daughter

## 2024-12-05 NOTE — BH INPATIENT PSYCHIATRY PROGRESS NOTE - NSICDXBHPRIMARYDX_PSY_ALL_CORE
Psychosis, unspecified psychosis type   F29   Major depressive disorder with psychotic features   F32.3

## 2024-12-05 NOTE — BH INPATIENT PSYCHIATRY DISCHARGE NOTE - NSBHFUPINTERVALHXFT_PSY_A_CORE
Pt seen and evaluated, chart reviewed. As per nursing report, pt anxious overnight, PRNs given with fair results. On evaluation, pt presents linear and cooperative, well related, agrees to interview privately in room. She reports she is "ok" with fair mood, sleep and appetite. She denies AVH. She denies safety concerns, denies others are trying to harm/follow her. She denies SI/HI. She presents future-oriented and agrees to OP f/u. She is adherent to medications, denies negative side effects. Pt has been visible on unit and has not been a behavioral concern. Discharge today.

## 2024-12-06 VITALS — TEMPERATURE: 98 F | DIASTOLIC BLOOD PRESSURE: 86 MMHG | SYSTOLIC BLOOD PRESSURE: 128 MMHG | HEART RATE: 101 BPM

## 2024-12-06 PROCEDURE — 99238 HOSP IP/OBS DSCHRG MGMT 30/<: CPT

## 2024-12-06 RX ADMIN — ACETAMINOPHEN 500MG 650 MILLIGRAM(S): 500 TABLET, COATED ORAL at 07:00

## 2024-12-06 RX ADMIN — GABAPENTIN 800 MILLIGRAM(S): 300 CAPSULE ORAL at 08:13

## 2024-12-06 RX ADMIN — Medication 20 MILLIGRAM(S): at 08:13

## 2024-12-06 RX ADMIN — CLONIDINE HYDROCHLORIDE 0.2 MILLIGRAM(S): 0.3 TABLET ORAL at 08:13

## 2024-12-06 RX ADMIN — RISPERIDONE 1 MILLIGRAM(S): 4 TABLET ORAL at 08:13

## 2024-12-06 RX ADMIN — LAMOTRIGINE 100 MILLIGRAM(S): 50 TABLET, EXTENDED RELEASE ORAL at 08:13

## 2024-12-06 RX ADMIN — LIDOCAINE 1 PATCH: 40 CREAM TOPICAL at 08:41

## 2024-12-06 RX ADMIN — ACETAMINOPHEN 500MG 650 MILLIGRAM(S): 500 TABLET, COATED ORAL at 05:59

## 2024-12-06 RX ADMIN — Medication 5 MILLIGRAM(S): at 08:41

## 2024-12-06 RX ADMIN — AMLODIPINE BESYLATE 5 MILLIGRAM(S): 10 TABLET ORAL at 08:13

## 2024-12-06 NOTE — BH INPATIENT PSYCHIATRY PROGRESS NOTE - NSDCCRITERIA_PSY_ALL_CORE
When patient is able to care for self, no longer endorsing acute symptoms of psychosis

## 2024-12-06 NOTE — BH INPATIENT PSYCHIATRY PROGRESS NOTE - NSTXDEPRESPROGRES_PSY_ALL_CORE
No Change
Met - goal discontinued
Improving
No Change
Improving
No Change
Improving
No Change
No Change

## 2024-12-06 NOTE — BH INPATIENT PSYCHIATRY PROGRESS NOTE - PRN MEDS
MEDICATIONS  (PRN):  acetaminophen     Tablet .. 650 milliGRAM(s) Oral every 6 hours PRN Temp greater or equal to 38C (100.4F), Mild Pain (1 - 3)  diphenhydrAMINE 50 milliGRAM(s) Oral every 6 hours PRN Combative behavior  haloperidol     Tablet 5 milliGRAM(s) Oral every 6 hours PRN agitation  LORazepam     Tablet 2 milliGRAM(s) Oral every 6 hours PRN Agitation  
MEDICATIONS  (PRN):  cyclobenzaprine 5 milliGRAM(s) Oral three times a day PRN Muscle Spasm  diphenhydrAMINE 50 milliGRAM(s) Oral every 6 hours PRN Combative behavior  haloperidol     Tablet 5 milliGRAM(s) Oral every 6 hours PRN agitation  ibuprofen  Tablet. 600 milliGRAM(s) Oral every 12 hours PRN Mild Pain (1 - 3), Moderate Pain (4 - 6)  LORazepam     Tablet 2 milliGRAM(s) Oral every 6 hours PRN Agitation  
MEDICATIONS  (PRN):  acetaminophen     Tablet .. 650 milliGRAM(s) Oral every 6 hours PRN Mild Pain (1 - 3)  aluminum hydroxide/magnesium hydroxide/simethicone Suspension 30 milliLiter(s) Oral every 6 hours PRN Dyspepsia  cyclobenzaprine 5 milliGRAM(s) Oral three times a day PRN Muscle Spasm  diphenhydrAMINE 50 milliGRAM(s) Oral every 6 hours PRN Combative behavior  haloperidol     Tablet 5 milliGRAM(s) Oral every 6 hours PRN agitation  ibuprofen  Tablet. 600 milliGRAM(s) Oral every 12 hours PRN Mild Pain (1 - 3), Moderate Pain (4 - 6)  lidocaine   4% Patch 1 Patch Transdermal daily PRN pain  
MEDICATIONS  (PRN):  cyclobenzaprine 5 milliGRAM(s) Oral three times a day PRN Muscle Spasm  diphenhydrAMINE 50 milliGRAM(s) Oral every 6 hours PRN Combative behavior  haloperidol     Tablet 5 milliGRAM(s) Oral every 6 hours PRN agitation  ibuprofen  Tablet. 600 milliGRAM(s) Oral every 12 hours PRN Mild Pain (1 - 3), Moderate Pain (4 - 6)  LORazepam     Tablet 2 milliGRAM(s) Oral every 6 hours PRN Agitation  
MEDICATIONS  (PRN):  aluminum hydroxide/magnesium hydroxide/simethicone Suspension 30 milliLiter(s) Oral every 6 hours PRN Dyspepsia  cyclobenzaprine 5 milliGRAM(s) Oral three times a day PRN Muscle Spasm  diphenhydrAMINE 50 milliGRAM(s) Oral every 6 hours PRN Combative behavior  haloperidol     Tablet 5 milliGRAM(s) Oral every 6 hours PRN agitation  ibuprofen  Tablet. 600 milliGRAM(s) Oral every 12 hours PRN Mild Pain (1 - 3), Moderate Pain (4 - 6)  lidocaine   4% Patch 1 Patch Transdermal daily PRN pain  
MEDICATIONS  (PRN):  acetaminophen     Tablet .. 650 milliGRAM(s) Oral every 6 hours PRN Temp greater or equal to 38C (100.4F), Mild Pain (1 - 3)  diphenhydrAMINE 50 milliGRAM(s) Oral every 6 hours PRN Combative behavior  haloperidol     Tablet 5 milliGRAM(s) Oral every 6 hours PRN agitation  LORazepam     Tablet 2 milliGRAM(s) Oral every 6 hours PRN Agitation  
MEDICATIONS  (PRN):  acetaminophen     Tablet .. 650 milliGRAM(s) Oral every 6 hours PRN Mild Pain (1 - 3)  aluminum hydroxide/magnesium hydroxide/simethicone Suspension 30 milliLiter(s) Oral every 6 hours PRN Dyspepsia  cyclobenzaprine 5 milliGRAM(s) Oral three times a day PRN Muscle Spasm  diphenhydrAMINE 50 milliGRAM(s) Oral every 6 hours PRN Combative behavior  haloperidol     Tablet 5 milliGRAM(s) Oral every 6 hours PRN agitation  ibuprofen  Tablet. 600 milliGRAM(s) Oral every 12 hours PRN Mild Pain (1 - 3), Moderate Pain (4 - 6)  lidocaine   4% Patch 1 Patch Transdermal daily PRN pain  
MEDICATIONS  (PRN):  aluminum hydroxide/magnesium hydroxide/simethicone Suspension 30 milliLiter(s) Oral every 6 hours PRN Dyspepsia  cyclobenzaprine 5 milliGRAM(s) Oral three times a day PRN Muscle Spasm  diphenhydrAMINE 50 milliGRAM(s) Oral every 6 hours PRN Combative behavior  haloperidol     Tablet 5 milliGRAM(s) Oral every 6 hours PRN agitation  ibuprofen  Tablet. 600 milliGRAM(s) Oral every 12 hours PRN Mild Pain (1 - 3), Moderate Pain (4 - 6)  lidocaine   4% Patch 1 Patch Transdermal daily PRN pain  
MEDICATIONS  (PRN):  aluminum hydroxide/magnesium hydroxide/simethicone Suspension 30 milliLiter(s) Oral every 6 hours PRN Dyspepsia  cyclobenzaprine 5 milliGRAM(s) Oral three times a day PRN Muscle Spasm  diphenhydrAMINE 50 milliGRAM(s) Oral every 6 hours PRN Combative behavior  haloperidol     Tablet 5 milliGRAM(s) Oral every 6 hours PRN agitation  ibuprofen  Tablet. 600 milliGRAM(s) Oral every 12 hours PRN Mild Pain (1 - 3), Moderate Pain (4 - 6)  lidocaine   4% Patch 1 Patch Transdermal daily PRN pain  
MEDICATIONS  (PRN):  cyclobenzaprine 5 milliGRAM(s) Oral three times a day PRN Muscle Spasm  diphenhydrAMINE 50 milliGRAM(s) Oral every 6 hours PRN Combative behavior  haloperidol     Tablet 5 milliGRAM(s) Oral every 6 hours PRN agitation  ibuprofen  Tablet. 600 milliGRAM(s) Oral every 12 hours PRN Mild Pain (1 - 3), Moderate Pain (4 - 6)  LORazepam     Tablet 2 milliGRAM(s) Oral every 6 hours PRN Agitation

## 2024-12-06 NOTE — BH INPATIENT PSYCHIATRY PROGRESS NOTE - NSBHCHARTREVIEWINVESTIGATE_PSY_A_CORE FT
< from: 12 Lead ECG (11.23.24 @ 05:47) >      Ventricular Rate 76 BPM    Atrial Rate 76 BPM    P-R Interval 148 ms    QRS Duration 90 ms    Q-T Interval 394 ms    QTC Calculation(Bazett) 443 ms    P Axis 62 degrees    R Axis 76 degrees    T Axis 59 degrees    Diagnosis Line Normal sinus rhythmwith sinus arrhythmia  Normal ECG    < end of copied text >    
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
## 2024-12-06 NOTE — BH INPATIENT PSYCHIATRY PROGRESS NOTE - NSTXPSYCHOGOAL_PSY_ALL_CORE
State that he/she is only about 50% sure of the certainty of the delusions instead of 100% certain
Will identify 1 trigger/stressor that exacerbates thoughts
State that he/she is only about 50% sure of the certainty of the delusions instead of 100% certain
State that he/she is only about 50% sure of the certainty of the delusions instead of 100% certain
Will identify 1 trigger/stressor that exacerbates thoughts
State that he/she is only about 50% sure of the certainty of the delusions instead of 100% certain

## 2024-12-06 NOTE — BH INPATIENT PSYCHIATRY PROGRESS NOTE - NSTXPSYCHODATEEST_PSY_ALL_CORE
25-Nov-2024
26-Nov-2024
26-Nov-2024
25-Nov-2024
25-Nov-2024
26-Nov-2024
25-Nov-2024
25-Nov-2024
26-Nov-2024
25-Nov-2024

## 2024-12-06 NOTE — BH INPATIENT PSYCHIATRY PROGRESS NOTE - NSTXDEPRESINTERMD_PSY_ALL_CORE
Medications management, encourage groups/DBT

## 2024-12-06 NOTE — BH CHART NOTE - NS ED BHA SUICIDALITY PRESENT CURRENT PASSIVE IDEATION
Called and spoke to pt who states, \" I am going out of town from 9/25 thru 10/2/2021 and maybe alittle longer but not sure.\" Writer informed pt to call [employer] [phone] requesting information of cellular coverage where pt will be located during travels. Pt voiced understanding. Pt states, \"Luis Angel is a computer expert who was enquiring about cell service.\"    No
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
## 2024-12-06 NOTE — BH INPATIENT PSYCHIATRY PROGRESS NOTE - NSBHATTESTTYPEVISIT_PSY_A_CORE
On-site Attending supervising CECILE (99XXX codes)
Attending Only
On-site Attending supervising CECILE (99XXX codes)
Attending with Resident/Fellow/Student
On-site Attending supervising CECILE (99XXX codes)

## 2024-12-06 NOTE — BH INPATIENT PSYCHIATRY PROGRESS NOTE - NSBHMETABOLICLABS_PSY_ALL_CORE
All labs not within last 12 months, ordered
Labs within last 12 months
All labs not within last 12 months, ordered
Labs within last 12 months
All labs not within last 12 months, ordered
Labs within last 12 months

## 2024-12-06 NOTE — BH INPATIENT PSYCHIATRY PROGRESS NOTE - NSTXDEPRESDATETRGT_PSY_ALL_CORE
10-Dec-2024
02-Dec-2024
10-Dec-2024
17-Dec-2024
10-Dec-2024
10-Dec-2024
17-Dec-2024
10-Dec-2024

## 2024-12-06 NOTE — BH INPATIENT PSYCHIATRY PROGRESS NOTE - NSBHCHARTREVIEWLAB_PSY_A_CORE FT
Amphetamine, Urine (11.23.24 @ 09:22) Amphetamine, Urine: Positive  Complete Blood Count + Automated Diff (11.22.24 @ 22:53)   WBC Count: 6.05 K/uL  RBC Count: 4.22 M/uL  Hemoglobin: 11.1 g/dL  Hematocrit: 33.5 %  Mean Cell Volume: 79.4 fl  Mean Cell Hemoglobin: 26.3 pg  Mean Cell Hemoglobin Conc: 33.1 g/dL  Red Cell Distrib Width: 17.2 %  Platelet Count - Automated: 299 K/uL  Auto Neutrophil #: 3.71 K/uL  Auto Lymphocyte #: 1.67 K/uL  Auto Monocyte #: 0.60 K/uL  Auto Eosinophil #: 0.03 K/uL  Auto Basophil #: 0.03 K/uL  Auto Neutrophil %: 61.3%  Auto Lymphocyte %: 27.6 %  Auto Monocyte %: 9.9 %  Auto Eosinophil %: 0.5 %  Auto Basophil %: 0.5 %  Auto Immature Granulocyte %: 0.2 %  Basic Metabolic Panel (11.22.24 @ 22:53)   Sodium: 139 mmol/L  Potassium: 3.8 mmol/L  Chloride: 101: Chloride reference range changed from ..10/26/2022 mmol/L  Carbon Dioxide: 22.0 mmol/L  Anion Gap: 16 mmol/L  Blood Urea Nitrogen: 7.0 mg/dL  Creatinine: 0.88 mg/dL  Glucose: 90 mg/dL  Calcium: 9.6 mg/dL  eGFR: 87:

## 2024-12-06 NOTE — BH INPATIENT PSYCHIATRY PROGRESS NOTE - NSBHATTESTAPPBILLTIME_PSY_A_CORE
I attest my time as CECILE is greater than 50% of the total combined time spent on qualifying patient care activities. I have reviewed and verified the documentation.

## 2024-12-06 NOTE — BH INPATIENT PSYCHIATRY PROGRESS NOTE - NSTXSUICIDDATEEST_PSY_ALL_CORE
26-Nov-2024
24-Nov-2024
26-Nov-2024
24-Nov-2024
26-Nov-2024
26-Nov-2024
24-Nov-2024

## 2024-12-06 NOTE — BH INPATIENT PSYCHIATRY PROGRESS NOTE - NSBHMSETHTCONTENT_PSY_A_CORE
Ruminations
Delusions/Ruminations/Other
Delusions/Ruminations/Other
Delusions/Ruminations
Delusions/Ruminations/Other
Delusions/Ruminations/Other
Preoccupations/Other
Ruminations

## 2024-12-06 NOTE — BH INPATIENT PSYCHIATRY PROGRESS NOTE - NSICDXBHSECONDARYDX_PSY_ALL_CORE
Post traumatic stress disorder (PTSD)   F43.10  

## 2024-12-06 NOTE — BH INPATIENT PSYCHIATRY PROGRESS NOTE - NSTXPSYCHODATETRGT_PSY_ALL_CORE
02-Dec-2024
17-Dec-2024
02-Dec-2024
17-Dec-2024
02-Dec-2024

## 2024-12-06 NOTE — BH INPATIENT PSYCHIATRY PROGRESS NOTE - ATTENDING COMMENTS
Interviewed patient with the NP Kaylyn. Patient is superficially related, mildly irritable, focused on discharge to be with her daughter. Agree with the assessment and plan. 
Interviewed patient with the NP Kaylyn. Patient presents well related, reports feeling 'aggravated" due to her legal issues, but discharge-oriented without SI/HI, making appropriate future plans: to call a , to get her stuff out of shelter, etc. Agree with the assessment and plan. 
Interviewed patient with the NP Kaylyn. Patient is superficially related, mildly irritable, focused on discharge to be with her daughter. Agree with the assessment and plan.

## 2024-12-06 NOTE — BH INPATIENT PSYCHIATRY PROGRESS NOTE - NSTXSUICIDINTERMD_PSY_ALL_CORE
Medications management, encourage groups/DBT, safety plan, suicide precautions

## 2024-12-06 NOTE — BH INPATIENT PSYCHIATRY PROGRESS NOTE - NSBHCHARTREVIEWVS_PSY_A_CORE FT
Vital Signs Last 24 Hrs  T(C): 36.5 (12-06-24 @ 08:31), Max: 36.6 (12-05-24 @ 16:20)  T(F): 97.7 (12-06-24 @ 08:31), Max: 97.8 (12-05-24 @ 16:20)  HR: 101 (12-06-24 @ 08:31) (101 - 111)  BP: 128/86 (12-06-24 @ 08:31) (126/84 - 128/86)  BP(mean): --  RR: --  SpO2: --

## 2024-12-06 NOTE — BH INPATIENT PSYCHIATRY PROGRESS NOTE - NSTXDEPRESGOAL_PSY_ALL_CORE
Will identify 2 coping skills that assist in improving mood
Exhibit improvements in self-grooming, hygiene, sleep and appetite
Will identify 2 coping skills that assist in improving mood

## 2024-12-06 NOTE — BH INPATIENT PSYCHIATRY PROGRESS NOTE - NSICDXBHTERTIARYDX_PSY_ALL_CORE
R/O Schizophrenia   F20.9  R/O Schizoaffective disorder   F25.9  R/O Major depressive disorder with psychotic features   F32.3

## 2024-12-06 NOTE — BH INPATIENT PSYCHIATRY PROGRESS NOTE - NSTXPSYCHOPROGRES_PSY_ALL_CORE
Improving
Improving
No Change
No Change
Improving
Met - goal discontinued
No Change

## 2024-12-06 NOTE — BH CHART NOTE - RISK ASSESSMENT
Suicide and risk assessment performed prior to discharge. The patient with low acute risk. Protective factors include help-seeking bx, denying SI/HI, future orientation, goal-directed, no SIB, able to verbalize reasons for living, improving insight, able to safety plan. Risk factors include presenting/hx illness. Immediate risk was minimized by inpatient admission to a safe environment with appropriate supervision and limited access to lethal means. Future risk was minimized before discharge by treatment of acute episode, maximizing outpatient support, providing relevant patient education, discussing emergency procedures, and ensuring close follow-up. The patient remains at a low risk of self-harm, and such risk cannot be further ameliorated by continued inpatient treatment and the patient is therefore appropriate for discharge.

## 2024-12-06 NOTE — BH INPATIENT PSYCHIATRY PROGRESS NOTE - NSTXPROBPSYCHO_PSY_ALL_CORE
PSYCHOTIC SYMPTOMS
fall
PSYCHOTIC SYMPTOMS
PSYCHOTIC SYMPTOMS

## 2024-12-06 NOTE — BH INPATIENT PSYCHIATRY PROGRESS NOTE - NSBHMETABOLIC_PSY_ALL_CORE_FT
BMI: BMI (kg/m2): 32.7 (11-24-24 @ 01:11)  HbA1c: A1C with Estimated Average Glucose Result: 5.3 % (11-24-24 @ 07:26)    Glucose: POCT Blood Glucose.: 82 mg/dL (11-23-24 @ 16:18)    BP: 128/86 (12-06-24 @ 08:31) (122/75 - 166/77)Vital Signs Last 24 Hrs  T(C): 36.5 (12-06-24 @ 08:31), Max: 36.6 (12-05-24 @ 16:20)  T(F): 97.7 (12-06-24 @ 08:31), Max: 97.8 (12-05-24 @ 16:20)  HR: 101 (12-06-24 @ 08:31) (101 - 111)  BP: 128/86 (12-06-24 @ 08:31) (126/84 - 128/86)  BP(mean): --  RR: --  SpO2: --      Lipid Panel: Date/Time: 11-24-24 @ 07:26  Cholesterol, Serum: 184  LDL Cholesterol Calculated: 123  HDL Cholesterol, Serum: 51  Total Cholesterol/HDL Ration Measurement: --  Triglycerides, Serum: 50

## 2024-12-06 NOTE — BH INPATIENT PSYCHIATRY PROGRESS NOTE - NSTXDEPRESDATEEST_PSY_ALL_CORE
26-Nov-2024
25-Nov-2024
26-Nov-2024

## 2024-12-06 NOTE — BH INPATIENT PSYCHIATRY PROGRESS NOTE - NSBHMSEAFFQUAL_PSY_A_CORE
Irritable/Anxious
Euthymic/Anxious
Irritable/Anxious
Euthymic/Anxious
Depressed
Depressed/Irritable
Irritable/Anxious
Euthymic/Anxious

## 2024-12-06 NOTE — BH INPATIENT PSYCHIATRY PROGRESS NOTE - NSBHATTESTBILLING_PSY_A_CORE
[id number]-Nbzoxzffas OBS or IP - moderate complexity OR 35-49 mins
[id number]-Orqfhveiaj OBS or IP - moderate complexity OR 35-49 mins
[id number]-Pjxydqtxuz OBS or IP - moderate complexity OR 35-49 mins
[id number]-Qjmqsctsud OBS or IP - moderate complexity OR 35-49 mins
[id number]-Evbwucpzsr OBS or IP - moderate complexity OR 35-49 mins
[id number]-Ndjzuywmdq OBS or IP - moderate complexity OR 35-49 mins
[id number]-Civezflapi OBS or IP - moderate complexity OR 35-49 mins
[id number]-Apwaxqyeph OBS or IP - moderate complexity OR 35-49 mins
[id number]-Hvhueabjca OBS or IP - low complexity OR 25-34 mins
23300-Aggnevowmp OBS or IP - moderate complexity OR 35-49 mins

## 2024-12-06 NOTE — BH INPATIENT PSYCHIATRY PROGRESS NOTE - NSTXSUICIDDATETRGT_PSY_ALL_CORE
01-Dec-2024
17-Dec-2024
01-Dec-2024
17-Dec-2024
17-Dec-2024
01-Dec-2024
01-Dec-2024
17-Dec-2024

## 2024-12-06 NOTE — BH INPATIENT PSYCHIATRY PROGRESS NOTE - CURRENT MEDICATION
MEDICATIONS  (STANDING):  amLODIPine   Tablet 5 milliGRAM(s) Oral daily  cloNIDine 0.2 milliGRAM(s) Oral two times a day  DULoxetine 20 milliGRAM(s) Oral daily  gabapentin 800 milliGRAM(s) Oral three times a day  lamoTRIgine 100 milliGRAM(s) Oral two times a day  risperiDONE   Tablet 2 milliGRAM(s) Oral at bedtime  risperiDONE   Tablet 1 milliGRAM(s) Oral daily    MEDICATIONS  (PRN):  acetaminophen     Tablet .. 650 milliGRAM(s) Oral every 6 hours PRN Mild Pain (1 - 3)  aluminum hydroxide/magnesium hydroxide/simethicone Suspension 30 milliLiter(s) Oral every 6 hours PRN Dyspepsia  cyclobenzaprine 5 milliGRAM(s) Oral three times a day PRN Muscle Spasm  diphenhydrAMINE 50 milliGRAM(s) Oral every 6 hours PRN Combative behavior  haloperidol     Tablet 5 milliGRAM(s) Oral every 6 hours PRN agitation  ibuprofen  Tablet. 600 milliGRAM(s) Oral every 12 hours PRN Mild Pain (1 - 3), Moderate Pain (4 - 6)  lidocaine   4% Patch 1 Patch Transdermal daily PRN pain

## 2024-12-06 NOTE — BH INPATIENT PSYCHIATRY PROGRESS NOTE - NSBHATTESTBILLONSITE_PSY_A_CORE
CECILE to bill

## 2024-12-06 NOTE — BH INPATIENT PSYCHIATRY PROGRESS NOTE - NSTXSUICIDGOAL_PSY_ALL_CORE
Be able to state 3 reasons for living
Will develop a suicide prevention/safety plan
Be able to state 3 reasons for living
Will develop a suicide prevention/safety plan
Be able to state 3 reasons for living
Will develop a suicide prevention/safety plan
Will develop a suicide prevention/safety plan
Be able to state 3 reasons for living

## 2024-12-06 NOTE — BH INPATIENT PSYCHIATRY PROGRESS NOTE - NSTXSUICIDPROGRES_PSY_ALL_CORE
Improving
Met - goal discontinued
Improving

## 2024-12-06 NOTE — BH INPATIENT PSYCHIATRY PROGRESS NOTE - NSBHMSESPEECH_PSY_A_CORE
Normal volume, rate, productivity, spontaneity and articulation
Abnormal as indicated, otherwise normal...
Normal volume, rate, productivity, spontaneity and articulation

## 2024-12-06 NOTE — BH INPATIENT PSYCHIATRY PROGRESS NOTE - NSCGINOTASSESS_PSY_ALL_CORE
CGI not assessed

## 2024-12-06 NOTE — BH INPATIENT PSYCHIATRY PROGRESS NOTE - NSBHMSEPERCEPT_PSY_A_CORE
No abnormalities
Other
Other
No abnormalities
No abnormalities
Other
No abnormalities
No abnormalities
Other
No abnormalities

## 2024-12-09 NOTE — BH SOCIAL WORK CONFIRMATION FOLLOW UP NOTE - NSCOMMENTS_PSY_ALL_CORE
[facility] contact call attempted by  (12/9); patient is unreachable by phone.    [facility] contact call attempted by  (12/9); patient is unreachable by phone.

## 2024-12-10 DIAGNOSIS — F41.9 ANXIETY DISORDER, UNSPECIFIED: ICD-10-CM

## 2024-12-10 DIAGNOSIS — G62.9 POLYNEUROPATHY, UNSPECIFIED: ICD-10-CM

## 2024-12-10 DIAGNOSIS — I10 ESSENTIAL (PRIMARY) HYPERTENSION: ICD-10-CM

## 2024-12-10 DIAGNOSIS — J45.909 UNSPECIFIED ASTHMA, UNCOMPLICATED: ICD-10-CM

## 2024-12-10 DIAGNOSIS — F33.3 MAJOR DEPRESSIVE DISORDER, RECURRENT, SEVERE WITH PSYCHOTIC SYMPTOMS: ICD-10-CM

## 2024-12-10 DIAGNOSIS — R56.9 UNSPECIFIED CONVULSIONS: ICD-10-CM

## 2024-12-10 DIAGNOSIS — Z86.718 PERSONAL HISTORY OF OTHER VENOUS THROMBOSIS AND EMBOLISM: ICD-10-CM

## 2024-12-10 DIAGNOSIS — Z59.01 SHELTERED HOMELESSNESS: ICD-10-CM

## 2024-12-10 SDOH — ECONOMIC STABILITY - HOUSING INSECURITY: SHELTERED HOMELESSNESS: Z59.01
